# Patient Record
Sex: FEMALE | Race: WHITE | NOT HISPANIC OR LATINO | Employment: PART TIME | URBAN - METROPOLITAN AREA
[De-identification: names, ages, dates, MRNs, and addresses within clinical notes are randomized per-mention and may not be internally consistent; named-entity substitution may affect disease eponyms.]

---

## 2021-05-26 ENCOUNTER — RECORDS - HEALTHEAST (OUTPATIENT)
Dept: ADMINISTRATIVE | Facility: CLINIC | Age: 31
End: 2021-05-26

## 2021-06-16 PROBLEM — O42.90 RUPTURE OF MEMBRANES WITH DELAY OF DELIVERY: Status: ACTIVE | Noted: 2020-06-26

## 2021-06-16 PROBLEM — E66.01 MORBID OBESITY WITH BMI OF 40.0-44.9, ADULT (H): Status: ACTIVE | Noted: 2019-11-14

## 2021-06-16 PROBLEM — B95.1 GROUP BETA STREP POSITIVE: Status: ACTIVE | Noted: 2020-05-11

## 2021-06-16 PROBLEM — O26.899 RH NEGATIVE STATE IN ANTEPARTUM PERIOD: Status: ACTIVE | Noted: 2019-11-14

## 2021-06-16 PROBLEM — S46.002A INJURY OF LEFT ROTATOR CUFF: Status: ACTIVE | Noted: 2019-04-01

## 2021-06-16 PROBLEM — J30.9 ALLERGIC RHINITIS: Status: ACTIVE | Noted: 2017-08-17

## 2021-06-16 PROBLEM — Z86.69 HISTORY OF PAPILLEDEMA: Status: ACTIVE | Noted: 2020-01-02

## 2021-06-16 PROBLEM — F33.2 SEVERE EPISODE OF RECURRENT MAJOR DEPRESSIVE DISORDER, WITHOUT PSYCHOTIC FEATURES (H): Status: ACTIVE | Noted: 2017-10-17

## 2021-06-16 PROBLEM — Z01.00 ENCOUNTER FOR OPHTHALMIC EXAMINATION AND EVALUATION: Status: ACTIVE | Noted: 2020-01-02

## 2021-06-16 PROBLEM — K21.9 GASTROESOPHAGEAL REFLUX DISEASE WITHOUT ESOPHAGITIS: Status: ACTIVE | Noted: 2017-08-17

## 2021-06-16 PROBLEM — O99.213 OBESITY AFFECTING PREGNANCY IN THIRD TRIMESTER: Status: ACTIVE | Noted: 2020-03-17

## 2021-06-16 PROBLEM — F33.1 MAJOR DEPRESSIVE DISORDER, RECURRENT, MODERATE (H): Status: ACTIVE | Noted: 2017-12-04

## 2021-06-16 PROBLEM — Z67.91 RH NEGATIVE STATE IN ANTEPARTUM PERIOD: Status: ACTIVE | Noted: 2019-11-14

## 2021-12-24 ENCOUNTER — HOSPITAL ENCOUNTER (EMERGENCY)
Facility: CLINIC | Age: 31
Discharge: HOME | End: 2021-12-24
Attending: EMERGENCY MEDICINE | Admitting: EMERGENCY MEDICINE
Payer: COMMERCIAL

## 2021-12-24 ENCOUNTER — APPOINTMENT (OUTPATIENT)
Dept: CT IMAGING | Facility: CLINIC | Age: 31
End: 2021-12-24
Attending: EMERGENCY MEDICINE
Payer: COMMERCIAL

## 2021-12-24 VITALS
HEIGHT: 66 IN | TEMPERATURE: 98.2 F | BODY MASS INDEX: 40.02 KG/M2 | DIASTOLIC BLOOD PRESSURE: 84 MMHG | RESPIRATION RATE: 23 BRPM | HEART RATE: 75 BPM | OXYGEN SATURATION: 98 % | WEIGHT: 249 LBS | SYSTOLIC BLOOD PRESSURE: 111 MMHG

## 2021-12-24 DIAGNOSIS — R10.13 ABDOMINAL PAIN, EPIGASTRIC: ICD-10-CM

## 2021-12-24 LAB
ALBUMIN SERPL-MCNC: 3.6 G/DL (ref 3.5–5)
ALP SERPL-CCNC: 85 U/L (ref 45–120)
ALT SERPL W P-5'-P-CCNC: 26 U/L (ref 0–45)
ANION GAP SERPL CALCULATED.3IONS-SCNC: 8 MMOL/L (ref 5–18)
AST SERPL W P-5'-P-CCNC: 19 U/L (ref 0–40)
ATRIAL RATE - MUSE: 80 BPM
BASOPHILS # BLD AUTO: 0 10E3/UL (ref 0–0.2)
BASOPHILS NFR BLD AUTO: 0 %
BILIRUB DIRECT SERPL-MCNC: <0.1 MG/DL
BILIRUB SERPL-MCNC: 0.2 MG/DL (ref 0–1)
BUN SERPL-MCNC: 11 MG/DL (ref 8–22)
CALCIUM SERPL-MCNC: 8.6 MG/DL (ref 8.5–10.5)
CHLORIDE BLD-SCNC: 109 MMOL/L (ref 98–107)
CO2 SERPL-SCNC: 24 MMOL/L (ref 22–31)
CREAT SERPL-MCNC: 0.78 MG/DL (ref 0.6–1.1)
DIASTOLIC BLOOD PRESSURE - MUSE: NORMAL MMHG
EOSINOPHIL # BLD AUTO: 0.1 10E3/UL (ref 0–0.7)
EOSINOPHIL NFR BLD AUTO: 3 %
ERYTHROCYTE [DISTWIDTH] IN BLOOD BY AUTOMATED COUNT: 12 % (ref 10–15)
GFR SERPL CREATININE-BSD FRML MDRD: >90 ML/MIN/1.73M2
GLUCOSE BLD-MCNC: 84 MG/DL (ref 70–125)
HCT VFR BLD AUTO: 41.8 % (ref 35–47)
HGB BLD-MCNC: 13.8 G/DL (ref 11.7–15.7)
HOLD SPECIMEN: NORMAL
HOLD SPECIMEN: NORMAL
IMM GRANULOCYTES # BLD: 0 10E3/UL
IMM GRANULOCYTES NFR BLD: 0 %
INTERPRETATION ECG - MUSE: NORMAL
LIPASE SERPL-CCNC: 41 U/L (ref 0–52)
LYMPHOCYTES # BLD AUTO: 1.5 10E3/UL (ref 0.8–5.3)
LYMPHOCYTES NFR BLD AUTO: 29 %
MCH RBC QN AUTO: 31.1 PG (ref 26.5–33)
MCHC RBC AUTO-ENTMCNC: 33 G/DL (ref 31.5–36.5)
MCV RBC AUTO: 94 FL (ref 78–100)
MONOCYTES # BLD AUTO: 0.3 10E3/UL (ref 0–1.3)
MONOCYTES NFR BLD AUTO: 6 %
NEUTROPHILS # BLD AUTO: 3.3 10E3/UL (ref 1.6–8.3)
NEUTROPHILS NFR BLD AUTO: 62 %
NRBC # BLD AUTO: 0 10E3/UL
NRBC BLD AUTO-RTO: 0 /100
P AXIS - MUSE: 60 DEGREES
PLATELET # BLD AUTO: 235 10E3/UL (ref 150–450)
POTASSIUM BLD-SCNC: 3.8 MMOL/L (ref 3.5–5)
PR INTERVAL - MUSE: 216 MS
PROT SERPL-MCNC: 6.7 G/DL (ref 6–8)
QRS DURATION - MUSE: 82 MS
QT - MUSE: 368 MS
QTC - MUSE: 424 MS
R AXIS - MUSE: 51 DEGREES
RBC # BLD AUTO: 4.44 10E6/UL (ref 3.8–5.2)
SODIUM SERPL-SCNC: 141 MMOL/L (ref 136–145)
SYSTOLIC BLOOD PRESSURE - MUSE: NORMAL MMHG
T AXIS - MUSE: 58 DEGREES
VENTRICULAR RATE- MUSE: 80 BPM
WBC # BLD AUTO: 5.3 10E3/UL (ref 4–11)

## 2021-12-24 PROCEDURE — 85025 COMPLETE CBC W/AUTO DIFF WBC: CPT | Performed by: EMERGENCY MEDICINE

## 2021-12-24 PROCEDURE — 258N000003 HC RX IP 258 OP 636: Performed by: EMERGENCY MEDICINE

## 2021-12-24 PROCEDURE — 96361 HYDRATE IV INFUSION ADD-ON: CPT

## 2021-12-24 PROCEDURE — 80053 COMPREHEN METABOLIC PANEL: CPT | Performed by: EMERGENCY MEDICINE

## 2021-12-24 PROCEDURE — 250N000013 HC RX MED GY IP 250 OP 250 PS 637: Performed by: EMERGENCY MEDICINE

## 2021-12-24 PROCEDURE — 250N000011 HC RX IP 250 OP 636: Performed by: EMERGENCY MEDICINE

## 2021-12-24 PROCEDURE — 250N000009 HC RX 250: Performed by: EMERGENCY MEDICINE

## 2021-12-24 PROCEDURE — 74177 CT ABD & PELVIS W/CONTRAST: CPT

## 2021-12-24 PROCEDURE — 93005 ELECTROCARDIOGRAM TRACING: CPT | Performed by: EMERGENCY MEDICINE

## 2021-12-24 PROCEDURE — 82248 BILIRUBIN DIRECT: CPT | Performed by: EMERGENCY MEDICINE

## 2021-12-24 PROCEDURE — 36415 COLL VENOUS BLD VENIPUNCTURE: CPT | Performed by: EMERGENCY MEDICINE

## 2021-12-24 PROCEDURE — 83690 ASSAY OF LIPASE: CPT | Performed by: EMERGENCY MEDICINE

## 2021-12-24 PROCEDURE — 99285 EMERGENCY DEPT VISIT HI MDM: CPT | Mod: 25

## 2021-12-24 PROCEDURE — 96374 THER/PROPH/DIAG INJ IV PUSH: CPT | Mod: 59

## 2021-12-24 RX ORDER — IOPAMIDOL 755 MG/ML
100 INJECTION, SOLUTION INTRAVASCULAR ONCE
Status: COMPLETED | OUTPATIENT
Start: 2021-12-24 | End: 2021-12-24

## 2021-12-24 RX ORDER — ACETAMINOPHEN 500 MG
1000 TABLET ORAL ONCE
Status: COMPLETED | OUTPATIENT
Start: 2021-12-24 | End: 2021-12-24

## 2021-12-24 RX ADMIN — ACETAMINOPHEN 1000 MG: 500 TABLET ORAL at 22:51

## 2021-12-24 RX ADMIN — IOPAMIDOL 100 ML: 755 INJECTION, SOLUTION INTRAVENOUS at 22:41

## 2021-12-24 RX ADMIN — SODIUM CHLORIDE 1000 ML: 9 INJECTION, SOLUTION INTRAVENOUS at 22:51

## 2021-12-24 RX ADMIN — FAMOTIDINE 20 MG: 10 INJECTION, SOLUTION INTRAVENOUS at 21:38

## 2021-12-24 RX ADMIN — LIDOCAINE HYDROCHLORIDE 15 ML: 20 SOLUTION ORAL; TOPICAL at 21:52

## 2021-12-24 ASSESSMENT — MIFFLIN-ST. JEOR: SCORE: 1871.21

## 2021-12-25 NOTE — ED PROVIDER NOTES
EMERGENCY DEPARTMENT ENCOUNTER      NAME: Catherine Rangel  AGE: 31 year old female  YOB: 1990  MRN: [id number]  EVALUATION DATE & TIME: 2021  8:50 PM    PCP: Lucien Knutson    ED PROVIDER: Jayden Donahue D.O.      CHIEF COMPLAINT:  Chief Complaint   Patient presents with     Chest Pain     Abdominal Pain     Headache         FINAL IMPRESSION:  1. Abdominal pain, epigastric          ED COURSE & MEDICAL DECISION MAKIN year old female with a history of fibromyalgia and GERD presented to the ED for evaluation of substernal chest pain that has been occurring for the last month as well as epigastric abdominal pain that is been ongoing for the last few days.  The patient describes both pains as a burning sensation and both were worsened with eating and drinking.  In addition to this she also complained of a frontal headache for the last few days.  Upon arrival to the ED the patient was noted to be hemodynamically stable.  She did not appear to be in any obvious distress or discomfort at time for initial evaluation.  On exam she was noted to have mild tenderness of patient located in the epigastric and left upper quadrant.  She did not have evidence of acute abdomen, however.  The remainder of her physical exam was unremarkable.  Following initial evaluation the patient was given a GI cocktail and IV famotidine to treat her burning epigastric/substernal chest pain.    An EKG was obtained which revealed normal sinus rhythm without any new or concerning ST or T wave changes.  CBC, BMP, lipase, and hepatic panel were all reassuring.    The patient was reevaluated and informed of the reassuring lab and EKG results.  At the time of reevaluation the patient stated that the upper abdominal pain had improved with the famotidine and GI cocktail.  The patient was informed that her upper abdominal pain is likely rated to her previous history of GERD.  Patient admitted to stopping her omeprazole months  ago which may be why it is worsening today.  However, at the time of reevaluation the patient was now stating that she was experiencing pain in the right lower quadrant.  An abdominal CT scan will be obtained for further evaluation.       The patient's care will be turned over to Dr. Sierra who will follow up on the abdominal CT scan.  If the CT scan is normal the patient can be discharged home with instructions to take her daily omeprazole.    9:15 PM I met with the patient, obtained history, performed an initial exam, and discussed options and plan for diagnostics and treatment here in the ED.     At the conclusion of the encounter I discussed the results of all of the tests and the disposition. The questions were answered. The patient or family acknowledged understanding and was agreeable with the care plan.     MEDICATIONS GIVEN IN THE EMERGENCY:  Medications   0.9% sodium chloride BOLUS (has no administration in time range)   lidocaine (viscous) (XYLOCAINE) 2 % 7.5 mL, alum & mag hydroxide-simethicone (MAALOX) 7.5 mL GI Cocktail (15 mLs Oral Given 12/24/21 2152)   famotidine (PEPCID) injection 20 mg (20 mg Intravenous Given 12/24/21 2138)       NEW PRESCRIPTIONS STARTED AT TODAY'S ER VISIT:  New Prescriptions    No medications on file          =================================================================    HPI  Catherine Rangel is a 31 year old female with a history of PCOS, GERD, cholecystectomy, and obesity, who presents via private vehicle for evaluation of chest and abdominal pain.    Patient reports onset of burning chest pain about one month ago. She notes being evaluated for this initially though has not followed up with her PCP since. She also endorses burning abdominal pain that feels somewhat similar to her chest pain, and notes that the pain is also reminiscent of that from her cholecystectomy. Patient reports both her chest and abdominal pain are exacerbated with movement and eating/drinking,  and is relieved at rest. She has had decreased po intake for the last few days due to this. Patient has been taking Omeprazole and Tylenol without relief. Patient also endorses a pounding frontal headache with onset three days ago; this is also exacerbated with movement. Additionally reports chills and diarrhea, though her diarrhea has since resolved. She denies nausea, vomiting, or generalized myalgias.       I, Korina Sosa am serving as a scribe to document services personally performed by Dr. Jayden Donahue DO, based on my observation and the provider's statements to me. I, Dr. Jayedn oDnahue DO attest that Korina Sosa is acting in a scribe capacity, has observed my performance of the services and has documented them in accordance with my direction.      REVIEW OF SYSTEMS   Constitutional: Endorses chills. Denies fever, unintentional weight loss or fatigue   Eyes: Denies visual changes or discharge    HENT: Denies sore throat, ear pain or neck pain  Respiratory: Denies cough or shortness of breath    Cardiovascular: Endorses chest pain. Denies palpitations or leg swelling  GI: Endorses abdominal pain. Denies nausea, vomiting, or dark, bloody stools.    : Denies hematuria, dysuria, or flank pain  Musculoskeletal: Denies any new back pain or new muscle/joint pains  Skin: Denies rash or wound  Neurologic: Endorses frontal headache. Denies new weakness, focal weakness, or sensory changes    Lymphatic: Denies swollen glands    Psychiatric: Denies depression, suicidal ideation or homicidal ideation.    Remainder of systems reviewed, unless noted in HPI all others negative.      PAST MEDICAL HISTORY:  Past Medical History:   Diagnosis Date     Depression      Dural sinus thrombosis      Fibromyalgia      GERD (gastroesophageal reflux disease)      Obesity        PAST SURGICAL HISTORY:  Past Surgical History:   Procedure Laterality Date     CHOLECYSTECTOMY             CURRENT MEDICATIONS:    Prior to Admission  medications    Medication Sig Start Date End Date Taking? Authorizing Provider   EPINEPHrine (EPIPEN) 0.3 mg/0.3 mL atIn [EPINEPHRINE (EPIPEN) 0.3 MG/0.3 ML ATIN] Inject 0.3 mL (0.3 mg total) into the shoulder, thigh, or buttocks as needed. 8/6/17   Leon Chan,    famotidine (PEPCID) 40 MG tablet [FAMOTIDINE (PEPCID) 40 MG TABLET] Take 1 tablet (40 mg total) by mouth at bedtime as needed for heartburn. 6/25/17   Tod Guerin MD   lidocaine (XYLOCAINE) 5 % ointment [LIDOCAINE (XYLOCAINE) 5 % OINTMENT] Please apply to the affected area of the mouth up to 4 times a day for the next week. 4/19/20   Glenn Knight, MICAELA   predniSONE (DELTASONE) 50 MG tablet [PREDNISONE (DELTASONE) 50 MG TABLET] Take 1 tablet (50 mg total) by mouth daily. 8/6/17   Leon Chan,          ALLERGIES:  Allergies   Allergen Reactions     Blood-Group Specific Substance Other (See Comments)     Patient has probable passive anti-D. Blood product orders may be delayed. Draw one red top and two purple top tubes for all Type and Screen/Type and crossmatch orders.     Cefaclor Other (See Comments)     Latex Itching       FAMILY HISTORY:  No family history on file.    SOCIAL HISTORY:   Social History     Socioeconomic History     Marital status: Single     Spouse name: Not on file     Number of children: Not on file     Years of education: Not on file     Highest education level: Not on file   Occupational History     Not on file   Tobacco Use     Smoking status: Never Smoker     Smokeless tobacco: Not on file   Substance and Sexual Activity     Alcohol use: Yes     Comment: Alcoholic Drinks/day: occasional     Drug use: Not on file     Sexual activity: Not on file   Other Topics Concern     Not on file   Social History Narrative     Not on file     Social Determinants of Health     Financial Resource Strain: Not on file   Food Insecurity: Not on file   Transportation Needs: Not on file   Physical Activity: Not on file  "  Stress: Not on file   Social Connections: Not on file   Intimate Partner Violence: Not on file   Housing Stability: Not on file       PHYSICAL EXAM    /84   Pulse 84   Temp 98.2  F (36.8  C) (Oral)   Resp 18   Ht 1.676 m (5' 6\")   Wt 112.9 kg (249 lb)   LMP 12/22/2021 (Exact Date)   SpO2 98%   BMI 40.19 kg/m    General presentation: Alert, Vital signs reviewed. NAD  HENT: ENT inspection is normal. Oropharynx is moist and clear.   Eye: Pupils are equal and reactive to light. EOMI  Neck: The neck is supple, with full ROM, with no evidence of meningismus.  Pulmonary: Currently in no acute respiratory distress. Normal, non labored respirations, the lung sounds are normal with good equal air movement. Clear to auscultation bilaterally.   Circulatory: Regular rate and rhythm. Peripheral pulses are strong and equal. No murmurs, rubs, or gallops.   Abdominal: The abdomen is soft. Mild epigastric and LUQ TTP. No rigidity, guarding, or rebound. Bowel sounds normal.   Neurologic: Alert, oriented to person, place, and time. No motor deficit. No sensory deficit. Cranial nerves II through XII are intact.  Musculoskeletal: No extremity tenderness. Full range of motion in all extremities. No extremity edema.   Skin: Skin color is normal. No rash. Warm. Dry to touch.        LAB:  All pertinent labs reviewed and interpreted.  Labs Ordered and Resulted from Time of ED Arrival to Time of ED Departure   BASIC METABOLIC PANEL - Abnormal       Result Value    Sodium 141      Potassium 3.8      Chloride 109 (*)     Carbon Dioxide (CO2) 24      Anion Gap 8      Urea Nitrogen 11      Creatinine 0.78      Calcium 8.6      Glucose 84      GFR Estimate >90     HEPATIC FUNCTION PANEL - Normal    Bilirubin Total 0.2      Bilirubin Direct <0.1      Protein Total 6.7      Albumin 3.6      Alkaline Phosphatase 85      AST 19      ALT 26     CBC WITH PLATELETS AND DIFFERENTIAL    WBC Count 5.3      RBC Count 4.44      Hemoglobin 13.8  "     Hematocrit 41.8      MCV 94      MCH 31.1      MCHC 33.0      RDW 12.0      Platelet Count 235      % Neutrophils 62      % Lymphocytes 29      % Monocytes 6      % Eosinophils 3      % Basophils 0      % Immature Granulocytes 0      NRBCs per 100 WBC 0      Absolute Neutrophils 3.3      Absolute Lymphocytes 1.5      Absolute Monocytes 0.3      Absolute Eosinophils 0.1      Absolute Basophils 0.0      Absolute Immature Granulocytes 0.0      Absolute NRBCs 0.0     LIPASE       RADIOLOGY:  Reviewed all pertinent imaging. Please see official radiology reports  No orders to display         EKG:    Normal sinus rhythm.  Rate of 80.  First-degree AV block.  Normal QRS.  Normal QT.  No ST or T wave changes.  Compared to the EKG on 5/19/2020 the first-degree block appears new and the previous nonspecific T wave changes have resolved.    I have independently reviewed and interpreted the EKG(s) documented above.      I, Korina Sosa, am serving as a scribe to document services personally performed by Dr. Jayden Donahue based on my observation and the provider's statements to me. I, Jayden Donahue, DO attest that Korina Sosa is acting in a scribe capacity, has observed my performance of the services and has documented them in accordance with my direction.    Jayden Donahue D.O.  Emergency Medicine  [facility] EMERGENCY ROOM  [address] [facility] [address] [phone]  Dept: [phone]        Jayden Donahue DO  12/24/21 0101

## 2021-12-25 NOTE — ED PROVIDER NOTES
"ED SIGNOUT  Date/Time:12/24/2021 10:48 PM    Patient signed out to me by my colleague, Dr. Jayden Donahue.  Please see their note for complete history and physical. Plan to follow up on CT and disposition.     Briefly, Catherine Rangel is a 31 year old female with a history of PCOS, GERD, cholecystectomy, and obesity, who presents via private vehicle for evaluation of chest and abdominal pain.         The creation of this record is based on the scribe s observations of the work being performed by Maddison Sierra MD and the provider s statements to them. It was created on their behalf by Carlos London a trained medical scribe. This document has been checked and approved by the attending provider.      REMAINING ED WORKUP:    Vitals:  /84   Pulse 75   Temp 98.2  F (36.8  C) (Oral)   Resp 23   Ht 1.676 m (5' 6\")   Wt 112.9 kg (249 lb)   LMP 12/22/2021 (Exact Date)   SpO2 98%   BMI 40.19 kg/m        Pertinent labs results reviewed   Results for orders placed or performed during the hospital encounter of 12/24/21   CT Abdomen Pelvis w Contrast    Impression    IMPRESSION:   1.  Cholecystectomy.  2.  Diverticulosis left colon, without evidence for diverticulitis.  3.  No evidence for appendicitis.   Basic metabolic panel   Result Value Ref Range    Sodium 141 136 - 145 mmol/L    Potassium 3.8 3.5 - 5.0 mmol/L    Chloride 109 (H) 98 - 107 mmol/L    Carbon Dioxide (CO2) 24 22 - 31 mmol/L    Anion Gap 8 5 - 18 mmol/L    Urea Nitrogen 11 8 - 22 mg/dL    Creatinine 0.78 0.60 - 1.10 mg/dL    Calcium 8.6 8.5 - 10.5 mg/dL    Glucose 84 70 - 125 mg/dL    GFR Estimate >90 >60 mL/min/1.73m2   Hepatic function panel   Result Value Ref Range    Bilirubin Total 0.2 0.0 - 1.0 mg/dL    Bilirubin Direct <0.1 <=0.5 mg/dL    Protein Total 6.7 6.0 - 8.0 g/dL    Albumin 3.6 3.5 - 5.0 g/dL    Alkaline Phosphatase 85 45 - 120 U/L    AST 19 0 - 40 U/L    ALT 26 0 - 45 U/L   Result Value Ref Range    Lipase 41 0 - 52 U/L   CBC with " platelets and differential   Result Value Ref Range    WBC Count 5.3 4.0 - 11.0 10e3/uL    RBC Count 4.44 3.80 - 5.20 10e6/uL    Hemoglobin 13.8 11.7 - 15.7 g/dL    Hematocrit 41.8 35.0 - 47.0 %    MCV 94 78 - 100 fL    MCH 31.1 26.5 - 33.0 pg    MCHC 33.0 31.5 - 36.5 g/dL    RDW 12.0 10.0 - 15.0 %    Platelet Count 235 150 - 450 10e3/uL    % Neutrophils 62 %    % Lymphocytes 29 %    % Monocytes 6 %    % Eosinophils 3 %    % Basophils 0 %    % Immature Granulocytes 0 %    NRBCs per 100 WBC 0 <1 /100    Absolute Neutrophils 3.3 1.6 - 8.3 10e3/uL    Absolute Lymphocytes 1.5 0.8 - 5.3 10e3/uL    Absolute Monocytes 0.3 0.0 - 1.3 10e3/uL    Absolute Eosinophils 0.1 0.0 - 0.7 10e3/uL    Absolute Basophils 0.0 0.0 - 0.2 10e3/uL    Absolute Immature Granulocytes 0.0 <=0.4 10e3/uL    Absolute NRBCs 0.0 10e3/uL   Extra Blue Top Tube   Result Value Ref Range    Hold Specimen JIC    Extra Red Top Tube   Result Value Ref Range    Hold Specimen JIC    ECG 12-LEAD WITH MUSE (LHE)   Result Value Ref Range    Systolic Blood Pressure  mmHg    Diastolic Blood Pressure  mmHg    Ventricular Rate 80 BPM    Atrial Rate 80 BPM    WA Interval 216 ms    QRS Duration 82 ms     ms    QTc 424 ms    P Axis 60 degrees    R AXIS 51 degrees    T Axis 58 degrees    Interpretation ECG       Sinus rhythm with 1st degree A-V block  Otherwise normal ECG  When compared with ECG of 19-APR-2020 19:44,  WA interval has increased  T wave inversion no longer evident in Inferior leads  Nonspecific T wave abnormality no longer evident in Anterior leads  Confirmed by SEE ED PROVIDER NOTE FOR, ECG INTERPRETATION ([id number]),  ELVIA MOON ([id number]) on 12/24/2021 10:10:57 PM         Pertinent imaging reviewed   Please see official radiology report.  CT Abdomen Pelvis w Contrast   Final Result   IMPRESSION:    1.  Cholecystectomy.   2.  Diverticulosis left colon, without evidence for diverticulitis.   3.  No evidence for appendicitis.            Interventions  Medications   lidocaine (viscous) (XYLOCAINE) 2 % 7.5 mL, alum & mag hydroxide-simethicone (MAALOX) 7.5 mL GI Cocktail (15 mLs Oral Given 12/24/21 2152)   famotidine (PEPCID) injection 20 mg (20 mg Intravenous Given 12/24/21 2138)   0.9% sodium chloride BOLUS (1,000 mLs Intravenous New Bag 12/24/21 2251)   iopamidol (ISOVUE-370) solution 100 mL (100 mLs Intravenous Given 12/24/21 2241)   acetaminophen (TYLENOL) tablet 1,000 mg (1,000 mg Oral Given 12/24/21 2251)        ED Course/MDM:  10:35 PM Signout accepted from Dr.Jaremy Donahue.  Prior records were reviewed.  Diagnostics from this visit are reviewed.    CT scan here without any acute processes noted.  Patient with likely GERD, discharged with instruction to continue to take her omeprazole.           1. Abdominal pain, epigastric          Violeta Sierra MD  [facility] Department         Violeta Sierra MD  12/24/21 0455

## 2021-12-25 NOTE — ED TRIAGE NOTES
Chest pain for a couple weeks, abdominal pain x 4 days and migraine headache that feels different from her normal, feels like pounding pain that started yesterday.  Pt denies fever, reports diarrhea yesterday.

## 2021-12-25 NOTE — DISCHARGE INSTRUCTIONS
Thankfully your CAT scan here did not show any evidence of appendicitis or any other intra-abdominal abnormalities.  Please take your omeprazole daily as directed.  Follow-up with your primary care physician for reevaluation or return back to ED sooner for any worsening abdominal pain, chest pain, or any other new or concerning symptoms.

## 2022-06-07 ENCOUNTER — APPOINTMENT (OUTPATIENT)
Dept: ULTRASOUND IMAGING | Facility: CLINIC | Age: 32
End: 2022-06-07
Attending: EMERGENCY MEDICINE
Payer: COMMERCIAL

## 2022-06-07 ENCOUNTER — HOSPITAL ENCOUNTER (EMERGENCY)
Facility: CLINIC | Age: 32
Discharge: HOME | End: 2022-06-07
Attending: STUDENT IN AN ORGANIZED HEALTH CARE EDUCATION/TRAINING PROGRAM | Admitting: STUDENT IN AN ORGANIZED HEALTH CARE EDUCATION/TRAINING PROGRAM
Payer: COMMERCIAL

## 2022-06-07 VITALS
HEIGHT: 67 IN | HEART RATE: 99 BPM | RESPIRATION RATE: 18 BRPM | SYSTOLIC BLOOD PRESSURE: 159 MMHG | DIASTOLIC BLOOD PRESSURE: 91 MMHG | OXYGEN SATURATION: 97 % | WEIGHT: 250 LBS | TEMPERATURE: 97.3 F | BODY MASS INDEX: 39.24 KG/M2

## 2022-06-07 DIAGNOSIS — O20.9 BLEEDING IN EARLY PREGNANCY: ICD-10-CM

## 2022-06-07 LAB
ABO/RH(D): NORMAL
ALBUMIN UR-MCNC: NEGATIVE MG/DL
ANTIBODY SCREEN: NEGATIVE
APPEARANCE UR: CLEAR
BACTERIA #/AREA URNS HPF: ABNORMAL /HPF
BASOPHILS # BLD AUTO: 0 10E3/UL (ref 0–0.2)
BASOPHILS NFR BLD AUTO: 0 %
BILIRUB UR QL STRIP: NEGATIVE
COLOR UR AUTO: ABNORMAL
EOSINOPHIL # BLD AUTO: 0.1 10E3/UL (ref 0–0.7)
EOSINOPHIL NFR BLD AUTO: 2 %
ERYTHROCYTE [DISTWIDTH] IN BLOOD BY AUTOMATED COUNT: 12.6 % (ref 10–15)
GLUCOSE UR STRIP-MCNC: NEGATIVE MG/DL
HCG SERPL-ACNC: 635 MLU/ML (ref 0–4)
HCG UR QL: POSITIVE
HCT VFR BLD AUTO: 40.1 % (ref 35–47)
HGB BLD-MCNC: 13.4 G/DL (ref 11.7–15.7)
HGB UR QL STRIP: NEGATIVE
IMM GRANULOCYTES # BLD: 0 10E3/UL
IMM GRANULOCYTES NFR BLD: 0 %
KETONES UR STRIP-MCNC: NEGATIVE MG/DL
LEUKOCYTE ESTERASE UR QL STRIP: NEGATIVE
LYMPHOCYTES # BLD AUTO: 1.8 10E3/UL (ref 0.8–5.3)
LYMPHOCYTES NFR BLD AUTO: 25 %
MCH RBC QN AUTO: 31.2 PG (ref 26.5–33)
MCHC RBC AUTO-ENTMCNC: 33.4 G/DL (ref 31.5–36.5)
MCV RBC AUTO: 93 FL (ref 78–100)
MONOCYTES # BLD AUTO: 0.5 10E3/UL (ref 0–1.3)
MONOCYTES NFR BLD AUTO: 7 %
MUCOUS THREADS #/AREA URNS LPF: PRESENT /LPF
NEUTROPHILS # BLD AUTO: 4.8 10E3/UL (ref 1.6–8.3)
NEUTROPHILS NFR BLD AUTO: 66 %
NITRATE UR QL: NEGATIVE
NRBC # BLD AUTO: 0 10E3/UL
NRBC BLD AUTO-RTO: 0 /100
PH UR STRIP: 6 [PH] (ref 5–7)
PLATELET # BLD AUTO: 263 10E3/UL (ref 150–450)
RBC # BLD AUTO: 4.3 10E6/UL (ref 3.8–5.2)
RBC URINE: 0 /HPF
SP GR UR STRIP: 1.01 (ref 1–1.03)
SPECIMEN EXPIRATION DATE: NORMAL
SQUAMOUS EPITHELIAL: 1 /HPF
UROBILINOGEN UR STRIP-MCNC: <2 MG/DL
WBC # BLD AUTO: 7.3 10E3/UL (ref 4–11)
WBC URINE: 1 /HPF

## 2022-06-07 PROCEDURE — 86901 BLOOD TYPING SEROLOGIC RH(D): CPT | Performed by: EMERGENCY MEDICINE

## 2022-06-07 PROCEDURE — 96372 THER/PROPH/DIAG INJ SC/IM: CPT | Performed by: STUDENT IN AN ORGANIZED HEALTH CARE EDUCATION/TRAINING PROGRAM

## 2022-06-07 PROCEDURE — 86850 RBC ANTIBODY SCREEN: CPT | Performed by: EMERGENCY MEDICINE

## 2022-06-07 PROCEDURE — 81025 URINE PREGNANCY TEST: CPT | Performed by: EMERGENCY MEDICINE

## 2022-06-07 PROCEDURE — 85025 COMPLETE CBC W/AUTO DIFF WBC: CPT | Performed by: EMERGENCY MEDICINE

## 2022-06-07 PROCEDURE — 81001 URINALYSIS AUTO W/SCOPE: CPT | Performed by: STUDENT IN AN ORGANIZED HEALTH CARE EDUCATION/TRAINING PROGRAM

## 2022-06-07 PROCEDURE — 81025 URINE PREGNANCY TEST: CPT | Performed by: STUDENT IN AN ORGANIZED HEALTH CARE EDUCATION/TRAINING PROGRAM

## 2022-06-07 PROCEDURE — 76801 OB US < 14 WKS SINGLE FETUS: CPT

## 2022-06-07 PROCEDURE — 84702 CHORIONIC GONADOTROPIN TEST: CPT | Performed by: EMERGENCY MEDICINE

## 2022-06-07 PROCEDURE — 81001 URINALYSIS AUTO W/SCOPE: CPT | Performed by: EMERGENCY MEDICINE

## 2022-06-07 PROCEDURE — 99284 EMERGENCY DEPT VISIT MOD MDM: CPT | Mod: 25

## 2022-06-07 PROCEDURE — 36415 COLL VENOUS BLD VENIPUNCTURE: CPT | Performed by: EMERGENCY MEDICINE

## 2022-06-07 PROCEDURE — 250N000011 HC RX IP 250 OP 636: Performed by: STUDENT IN AN ORGANIZED HEALTH CARE EDUCATION/TRAINING PROGRAM

## 2022-06-07 RX ORDER — CEPHALEXIN 500 MG/1
500 CAPSULE ORAL 4 TIMES DAILY
Qty: 20 CAPSULE | Refills: 0 | Status: SHIPPED | OUTPATIENT
Start: 2022-06-07 | End: 2022-06-12

## 2022-06-07 RX ADMIN — HUMAN RHO(D) IMMUNE GLOBULIN 300 MCG: 1500 SOLUTION INTRAMUSCULAR; INTRAVENOUS at 18:38

## 2022-06-07 NOTE — ED PROVIDER NOTES
Emergency Department Encounter         FINAL IMPRESSION:  Vaginal bleeding first trimester        ED COURSE AND MEDICAL DECISION MAKING       ED Course as of 22 7168   Tue 2022   4257 Patient is a morbidly obese  at an unknown gestational age here after she has been having vaginal spotting over the past few weeks and now took a pregnancy test today and was positive.  She came in for evaluation.  Has abdominal cramping with no severe pain.  No chest pain or trouble breathing.  No dysuria or bowel movement changes.  Arrival her vitals are stable.  Social clinically.  Heart and lungs normal.  Abdomen is obese but benign.  Plan for basic labs, beta quant as well as ultrasound to evaluate   -Ultrasound showing intrauterine gestation.  Patient he medically stable.  Given OB/GYN follow-up.  -Patient O-.  Given RhoGAM.  Also has asymptomatic bacteria.  Plan for Keflex.  - Patient denies any abnormal vaginal discharge that would necessitate pelvic exam      4:51 PM I met with the patient for the initial interview and physical examination. Discussed plan for treatment and workup in the ED. PPE: Provider wore gloves, and paper mask.    5:58 PM I rechecked and updated the patient. I discussed the plan for discharge with the patient, and patient is agreeable. We discussed supportive cares at home and reasons for return to the ER including new or worsening symptoms - all questions and concerns addressed. Patient to be discharged by RN.     EKG  None.     At the conclusion of the encounter I discussed the results of all the tests and the disposition. The questions were answered. The patient or family acknowledged understanding and was agreeable with the care plan.                  MEDICATIONS GIVEN IN THE EMERGENCY DEPARTMENT:  Medications - No data to display    NEW PRESCRIPTIONS STARTED AT TODAY'S ED VISIT:  New Prescriptions    No medications on file       HPI     Patient information obtained from: the  patient     Use of Interpretor: N/A     Catherine Rangel is a 32 year old female with a pertinent history of fibromyalgia, Rh negative, and PCOS who presents to this ED via walk in for evaluation of vaginal bleeding.     The patient, who is a , reports the onset of spotting vaginal bleeding a few weeks ago. She states that she recently took a pregnancy test which was positive. She endorses intermittent crampy abdominal pain. She denies having spotting with any of her previous pregnancies. The patient denies dysuria, chest pain, shortness of breath, and any other symptoms or complaints at this time.     REVIEW OF SYSTEMS:  Review of Systems   Constitutional: Negative for fever, malaise  HEENT: Negative runny nose, sore throat, ear pain, neck pain  Respiratory: Negative for shortness of breath, cough, congestion  Cardiovascular: Negative for chest pain, leg edema  Gastrointestinal: Negative for abdominal distention, constipation, vomiting, nausea, diarrhea. Positive for abdominal pain   Genitourinary: Negative for dysuria and hematuria. Positive for vaginal bleeding.   Integument: Negative for rash, skin breakdown  Neurological: Negative for paresthesias, weakness, headache.  Musculoskeletal: Negative for joint pain, joint swelling      All other systems reviewed and are negative.          MEDICAL HISTORY     Past Medical History:   Diagnosis Date     Depression      Dural sinus thrombosis      Fibromyalgia      GERD (gastroesophageal reflux disease)      Obesity        Past Surgical History:   Procedure Laterality Date     CHOLECYSTECTOMY         Social History     Tobacco Use     Smoking status: Never Smoker   Substance Use Topics     Alcohol use: Yes     Comment: Alcoholic Drinks/day: occasional       EPINEPHrine (EPIPEN) 0.3 mg/0.3 mL atIn  famotidine (PEPCID) 40 MG tablet  lidocaine (XYLOCAINE) 5 % ointment  predniSONE (DELTASONE) 50 MG tablet            PHYSICAL EXAM     BP (!) 159/91   Pulse 99   Temp  "97.3  F (36.3  C) (Temporal)   Resp 18   Ht 1.702 m (5' 7\")   Wt 113.4 kg (250 lb)   SpO2 97%   BMI 39.16 kg/m        PHYSICAL EXAM:     General: Patient appears well, nontoxic, comfortable  HEENT: Moist mucous membranes, no tongue swelling.  No head trauma.  No midline neck pain.  Cardiovascular: Normal rate, normal rhythm, no extremity edema.  No appreciable murmur.  Respiratory: No signs of respiratory distress, lungs are clear to auscultation bilaterally with no wheezes rhonchi or rales.  Abdominal: Soft, obese, nontender, nondistended, no palpable masses, no guarding, no rebound  Musculoskeletal: Full range of motion of joints, no deformities appreciated.  Neurological: Alert and oriented, grossly neurologically intact.  Psychological: Normal affect and mood.  Integument: No rashes appreciated          RESULTS       Labs Ordered and Resulted from Time of ED Arrival to Time of ED Departure   ROUTINE UA WITH MICROSCOPIC REFLEX TO CULTURE - Abnormal       Result Value    Color Urine Light Yellow      Appearance Urine Clear      Glucose Urine Negative      Bilirubin Urine Negative      Ketones Urine Negative      Specific Gravity Urine 1.011      Blood Urine Negative      pH Urine 6.0      Protein Albumin Urine Negative      Urobilinogen Urine <2.0      Nitrite Urine Negative      Leukocyte Esterase Urine Negative      Bacteria Urine Few (*)     Mucus Urine Present (*)     RBC Urine 0      WBC Urine 1      Squamous Epithelials Urine 1     HCG QUALITATIVE URINE - Abnormal    hCG Urine Qualitative Positive (*)    HCG QUANTITATIVE PREGNANCY - Abnormal    hCG Quantitative 635 (*)    CBC WITH PLATELETS AND DIFFERENTIAL    WBC Count 7.3      RBC Count 4.30      Hemoglobin 13.4      Hematocrit 40.1      MCV 93      MCH 31.2      MCHC 33.4      RDW 12.6      Platelet Count 263      % Neutrophils 66      % Lymphocytes 25      % Monocytes 7      % Eosinophils 2      % Basophils 0      % Immature Granulocytes 0      " NRBCs per 100 WBC 0      Absolute Neutrophils 4.8      Absolute Lymphocytes 1.8      Absolute Monocytes 0.5      Absolute Eosinophils 0.1      Absolute Basophils 0.0      Absolute Immature Granulocytes 0.0      Absolute NRBCs 0.0     TYPE AND SCREEN, ADULT    ABO/RH(D) O NEG      Antibody Screen Negative      SPECIMEN EXPIRATION DATE [id number]     ABO/RH TYPE AND SCREEN       OB  US 1st trimester w transvag   Final Result   IMPRESSION:    1.  Single very early intrauterine gestation with gestational sac equaling 4 weeks 5 days. Fetal pole not yet seen.      2.  Tiny subchorionic lead in the endometrial canal.                              PROCEDURES:  Procedures:  Procedures       I, Stefany Lamas am serving as a scribe to document services personally performed by Colin Will DO, based on my observations and the provider's statements to me.  I, Colin Will DO, attest that Stefany Cydney is acting in a scribe capacity, has observed my performance of the services and has documented them in accordance with my direction.    Colin Will DO  Emergency Medicine  [facility] EMERGENCY ROOM      Colin Will DO  06/07/22 1809       Colin Will DO  06/07/22 1809

## 2022-06-07 NOTE — ED TRIAGE NOTES
Pt here with vaginal spotting since last week. Has not been wearing a pad. Only sees it when she wipes. States last menses was in April but is has been irregular. Has no pain now.

## 2022-06-07 NOTE — DISCHARGE INSTRUCTIONS
Your blood work today was reassuring.  The ultrasound showed a very early pregnancy approximately 4 weeks.  Please call the OB/GYN clinic listed if you do not already have an OB/GYN otherwise return for any worsening abdominal pain, worsening bleeding which means soaking more than 1 pad every hour, or any other concerning symptoms.

## 2023-01-01 ENCOUNTER — HOSPITAL ENCOUNTER (EMERGENCY)
Facility: CLINIC | Age: 33
Discharge: HOME | End: 2023-01-01
Attending: STUDENT IN AN ORGANIZED HEALTH CARE EDUCATION/TRAINING PROGRAM | Admitting: STUDENT IN AN ORGANIZED HEALTH CARE EDUCATION/TRAINING PROGRAM
Payer: COMMERCIAL

## 2023-01-01 ENCOUNTER — APPOINTMENT (OUTPATIENT)
Dept: ULTRASOUND IMAGING | Facility: CLINIC | Age: 33
End: 2023-01-01
Attending: STUDENT IN AN ORGANIZED HEALTH CARE EDUCATION/TRAINING PROGRAM
Payer: COMMERCIAL

## 2023-01-01 VITALS
SYSTOLIC BLOOD PRESSURE: 138 MMHG | RESPIRATION RATE: 19 BRPM | OXYGEN SATURATION: 98 % | HEART RATE: 84 BPM | DIASTOLIC BLOOD PRESSURE: 71 MMHG | HEIGHT: 66 IN | TEMPERATURE: 97.2 F | BODY MASS INDEX: 40.18 KG/M2 | WEIGHT: 250 LBS

## 2023-01-01 DIAGNOSIS — N93.9 VAGINAL BLEEDING: ICD-10-CM

## 2023-01-01 PROBLEM — R06.83 SNORING: Status: ACTIVE | Noted: 2023-01-01

## 2023-01-01 LAB
ABO/RH(D): NORMAL
ALBUMIN UR-MCNC: NEGATIVE MG/DL
ANION GAP SERPL CALCULATED.3IONS-SCNC: 8 MMOL/L (ref 5–18)
ANTIBODY SCREEN: NEGATIVE
APPEARANCE UR: CLEAR
BACTERIA #/AREA URNS HPF: ABNORMAL /HPF
BILIRUB UR QL STRIP: NEGATIVE
BUN SERPL-MCNC: 9 MG/DL (ref 8–22)
CALCIUM SERPL-MCNC: 9.6 MG/DL (ref 8.5–10.5)
CHLORIDE BLD-SCNC: 104 MMOL/L (ref 98–107)
CO2 SERPL-SCNC: 23 MMOL/L (ref 22–31)
COLOR UR AUTO: ABNORMAL
CREAT SERPL-MCNC: 1.03 MG/DL (ref 0.6–1.1)
ERYTHROCYTE [DISTWIDTH] IN BLOOD BY AUTOMATED COUNT: 12.7 % (ref 10–15)
GFR SERPL CREATININE-BSD FRML MDRD: 75 ML/MIN/1.73M2
GLUCOSE BLD-MCNC: 79 MG/DL (ref 70–125)
GLUCOSE UR STRIP-MCNC: NEGATIVE MG/DL
HCG SERPL-ACNC: ABNORMAL MLU/ML (ref 0–4)
HCT VFR BLD AUTO: 41 % (ref 35–47)
HGB BLD-MCNC: 13.7 G/DL (ref 11.7–15.7)
HGB UR QL STRIP: NEGATIVE
KETONES UR STRIP-MCNC: NEGATIVE MG/DL
LEUKOCYTE ESTERASE UR QL STRIP: NEGATIVE
MCH RBC QN AUTO: 31.4 PG (ref 26.5–33)
MCHC RBC AUTO-ENTMCNC: 33.4 G/DL (ref 31.5–36.5)
MCV RBC AUTO: 94 FL (ref 78–100)
MUCOUS THREADS #/AREA URNS LPF: PRESENT /LPF
NITRATE UR QL: NEGATIVE
PH UR STRIP: 6.5 [PH] (ref 5–7)
PLATELET # BLD AUTO: 233 10E3/UL (ref 150–450)
POTASSIUM BLD-SCNC: 4.1 MMOL/L (ref 3.5–5)
RBC # BLD AUTO: 4.36 10E6/UL (ref 3.8–5.2)
RBC URINE: 0 /HPF
SODIUM SERPL-SCNC: 135 MMOL/L (ref 136–145)
SP GR UR STRIP: 1.02 (ref 1–1.03)
SPECIMEN EXPIRATION DATE: NORMAL
SQUAMOUS EPITHELIAL: 1 /HPF
UROBILINOGEN UR STRIP-MCNC: <2 MG/DL
WBC # BLD AUTO: 9.5 10E3/UL (ref 4–11)
WBC URINE: 7 /HPF

## 2023-01-01 PROCEDURE — 81001 URINALYSIS AUTO W/SCOPE: CPT | Performed by: STUDENT IN AN ORGANIZED HEALTH CARE EDUCATION/TRAINING PROGRAM

## 2023-01-01 PROCEDURE — 99285 EMERGENCY DEPT VISIT HI MDM: CPT | Mod: 25

## 2023-01-01 PROCEDURE — 85027 COMPLETE CBC AUTOMATED: CPT | Performed by: STUDENT IN AN ORGANIZED HEALTH CARE EDUCATION/TRAINING PROGRAM

## 2023-01-01 PROCEDURE — 80048 BASIC METABOLIC PNL TOTAL CA: CPT | Performed by: STUDENT IN AN ORGANIZED HEALTH CARE EDUCATION/TRAINING PROGRAM

## 2023-01-01 PROCEDURE — 84702 CHORIONIC GONADOTROPIN TEST: CPT | Performed by: STUDENT IN AN ORGANIZED HEALTH CARE EDUCATION/TRAINING PROGRAM

## 2023-01-01 PROCEDURE — 96372 THER/PROPH/DIAG INJ SC/IM: CPT | Performed by: STUDENT IN AN ORGANIZED HEALTH CARE EDUCATION/TRAINING PROGRAM

## 2023-01-01 PROCEDURE — 36415 COLL VENOUS BLD VENIPUNCTURE: CPT | Performed by: STUDENT IN AN ORGANIZED HEALTH CARE EDUCATION/TRAINING PROGRAM

## 2023-01-01 PROCEDURE — 86901 BLOOD TYPING SEROLOGIC RH(D): CPT | Performed by: STUDENT IN AN ORGANIZED HEALTH CARE EDUCATION/TRAINING PROGRAM

## 2023-01-01 PROCEDURE — 76801 OB US < 14 WKS SINGLE FETUS: CPT

## 2023-01-01 PROCEDURE — 250N000011 HC RX IP 250 OP 636: Performed by: STUDENT IN AN ORGANIZED HEALTH CARE EDUCATION/TRAINING PROGRAM

## 2023-01-01 RX ORDER — CEPHALEXIN 500 MG/1
500 CAPSULE ORAL 4 TIMES DAILY
Qty: 28 CAPSULE | Refills: 0 | Status: SHIPPED | OUTPATIENT
Start: 2023-01-01 | End: 2023-01-08

## 2023-01-01 RX ADMIN — HUMAN RHO(D) IMMUNE GLOBULIN 300 MCG: 1500 SOLUTION INTRAMUSCULAR; INTRAVENOUS at 15:26

## 2023-01-01 ASSESSMENT — ENCOUNTER SYMPTOMS
ABDOMINAL PAIN: 1
DYSURIA: 0
HEMATURIA: 0
COUGH: 1
HEADACHES: 1
NAUSEA: 1
FEVER: 0

## 2023-01-01 ASSESSMENT — ACTIVITIES OF DAILY LIVING (ADL): ADLS_ACUITY_SCORE: 35

## 2023-01-01 NOTE — ED TRIAGE NOTES
Positive pregnancy test a few weeks ago. Yesterday had some light spotting and today had some bright red blood she noticed after having a BM. Very mild cramping.  A1     Triage Assessment     Row Name 23 [id number]       Triage Assessment (Adult)    Airway WDL WDL       Respiratory WDL    Respiratory WDL WDL       Skin Circulation/Temperature WDL    Skin Circulation/Temperature WDL WDL       Cardiac WDL    Cardiac WDL WDL       Peripheral/Neurovascular WDL    Peripheral Neurovascular WDL WDL       Cognitive/Neuro/Behavioral WDL    Cognitive/Neuro/Behavioral WDL WDL

## 2023-01-01 NOTE — ED PROVIDER NOTES
NAME: Catherine Rangel  AGE: 32 year old female  YOB: 1990  MRN: [id number]  EVALUATION DATE & TIME: 2023  1:51 PM    PCP: Lucien Knutson  ED PROVIDER: Avis Hayden MD.    Chief Complaint   Patient presents with     Vaginal Bleeding - Pregnant       FINAL IMPRESSION:  1. Vaginal bleeding        MEDICAL DECISION MAKIN:58 PM I met with the patient, obtained history, performed an initial exam, and discussed options and plan for diagnostics and treatment here in the ED.     32 year old female presents to the Emergency Department for evaluation of vaginal bleeding in pregnancy (approximately 8 weeks by LMP). Vitals are generally reassuring. Differential includes but not limited to threatened , spontaneous , missed , ectopic pregnancy, placenta previa, placental abruption. She is not having large amount of bleeding or suspicions symptoms suggesting significant anemia and Hgb is 13.7. Rh negative, rhogam given. Ultrasound showed single IUP at 6 weeks 2 days, small to moderate subchorionic hemorrhage. Will treat UA given is pregnant, allergic to cefalor but had keflex this summer and tolerated it and she is okay trying this. No flank pain, fevers, doubt stone, pyelonephritis or sepsis. Recommended close follow up with OB/GYN. Strict return precautions discussed and patient is in agreement with plan, endorses understanding and their questions were answered.    Medical Decision Making    History:    Supplemental history from: N/A    External Record(s) reviewed: Documented in HPI, if applicable.    Work Up:    Chart documentation includes differential considered and any EKGs or imaging interpreted by provider.    In additional to work up documented, I considered the following work up: See chart documentation, if applicable.    External consultation:    Discussion of management with another provider: See chart documentation, if applicable    Complicating factors:    Care  impacted by chronic illness: N/A    Care affected by social determinants of health: N/A    Disposition considerations: Discharge. I prescribed additional prescription strength medication(s) as charted. I considered admission, but ultimately discharged patient given reassuring vitals, labs and imaging.      MEDICATIONS GIVEN IN THE EMERGENCY:  Medications   rho(D) immune globulin (RHOPHYLAC) injection 300 mcg (has no administration in time range)     Or   rho(D) immune globulin (RHOPHYLAC) injection 300 mcg (has no administration in time range)       NEW PRESCRIPTIONS STARTED AT TODAY'S ER VISIT:  New Prescriptions    No medications on file        =================================================================  HPI    Patient information was obtained from: Patient  Use of : N/A       Catherine Rangel is a 32 year old female with a past medical history of PCOS who presents for vaginal bleeding.     Per chart review:   On 6/07/2022 at [facility], patient presented for vaginal bleeding. Patient pregnant at an unknown gestational age. Ultrasound showed intrauterine gestation and tiny subchorionic lead in the endometrial canal. Patient is medically stable. Given OB GYN follow-up. Patient O-.  Given RhoGAM.  Also has asymptomatic bacteria. Plan for Keflex. No abnormal vaginal discharge to necessitate pelvic exam. Discharged in stable condition.     Patient endorses having a positive home pregnancy test a few weeks ago. Patient notes her last period was early November. Yesterday, patient noticed light spotting. Today, patient noticed lots of blood, but no clots, and not to the point of soaking pads. Patient also has associated lower middle abdominal cramping, headache, nausea, breast tenderness, and a slight cough.    Of note, patient had a miscarriage last year. Patient has concerns for a UTI. Patient denies fever, dysuria, hematuria, or any other complaints at this time.     REVIEW OF SYSTEMS    Review of Systems   Constitutional: Negative for fever.   Respiratory: Positive for cough (slight).    Gastrointestinal: Positive for abdominal pain (lower middle - cramping) and nausea.   Genitourinary: Positive for vaginal bleeding. Negative for dysuria and hematuria.   Musculoskeletal:        Positive for breast tenderness   Neurological: Positive for headaches.   All other systems reviewed and are negative.       PAST MEDICAL HISTORY:  Past Medical History:   Diagnosis Date     Depression      Dural sinus thrombosis      Fibromyalgia      GERD (gastroesophageal reflux disease)      Obesity        PAST SURGICAL HISTORY:  Past Surgical History:   Procedure Laterality Date     CHOLECYSTECTOMY         CURRENT MEDICATIONS:      Current Facility-Administered Medications:      rho(D) immune globulin (RHOPHYLAC) injection 300 mcg, 300 mcg, Intravenous, Once **OR** rho(D) immune globulin (RHOPHYLAC) injection 300 mcg, 300 mcg, Intramuscular, Once, Avis Hayden MD    Current Outpatient Medications:      EPINEPHrine (EPIPEN) 0.3 mg/0.3 mL atIn, [EPINEPHRINE (EPIPEN) 0.3 MG/0.3 ML ATIN] Inject 0.3 mL (0.3 mg total) into the shoulder, thigh, or buttocks as needed., Disp: 1, Rfl: 0     famotidine (PEPCID) 40 MG tablet, [FAMOTIDINE (PEPCID) 40 MG TABLET] Take 1 tablet (40 mg total) by mouth at bedtime as needed for heartburn., Disp: 20 tablet, Rfl: 0     lidocaine (XYLOCAINE) 5 % ointment, [LIDOCAINE (XYLOCAINE) 5 % OINTMENT] Please apply to the affected area of the mouth up to 4 times a day for the next week., Disp: 50 g, Rfl: 0     predniSONE (DELTASONE) 50 MG tablet, [PREDNISONE (DELTASONE) 50 MG TABLET] Take 1 tablet (50 mg total) by mouth daily., Disp: 5 tablet, Rfl: 0    ALLERGIES:  Allergies   Allergen Reactions     Blood-Group Specific Substance Other (See Comments)     Patient has probable passive anti-D. Blood product orders may be delayed. Draw one red top and two purple top tubes for all Type and Screen/Type  "and crossmatch orders.     Cefaclor Other (See Comments)     Latex Itching       FAMILY HISTORY:  History reviewed. No pertinent family history.    SOCIAL HISTORY:   Social History     Socioeconomic History     Marital status: Single   Tobacco Use     Smoking status: Never   Substance and Sexual Activity     Alcohol use: Yes     Comment: Alcoholic Drinks/day: occasional     PHYSICAL EXAM:    Vitals: /71   Pulse 84   Temp 97.2  F (36.2  C)   Resp 19   Ht 1.676 m (5' 6\")   Wt 113.4 kg (250 lb)   LMP 11/21/2022   SpO2 98%   BMI 40.35 kg/m     Constitutional: Well developed, well nourished. No acute distress.  HENT: Normocephalic, atraumatic, mucous membranes moist. Neck-gross ROM intact.   Eyes: Pupils mid-range, sclera white, no discharge  Respiratory: CTAB, no respiratory distress, no wheezing, speaks full sentences easily.  Cardiovascular: Normal heart rate, regular rhythm, no murmur  GI: Soft, not distended, not tender to palpation, no palpable masses  Musculoskeletal: Moving all 4 extremities intentionally and without pain. No obvious deformity.  Skin: Warm, dry, no rash.  Neurologic: Alert & oriented x 3, speech clear, moving all extremities spontaneously   Psychiatric: Affect normal, cooperative.     LAB:  All pertinent labs reviewed and interpreted.  Labs Ordered and Resulted from Time of ED Arrival to Time of ED Departure   BASIC METABOLIC PANEL - Abnormal       Result Value    Sodium 135 (*)     Potassium 4.1      Chloride 104      Carbon Dioxide (CO2) 23      Anion Gap 8      Urea Nitrogen 9      Creatinine 1.03      Calcium 9.6      Glucose 79      GFR Estimate 75     HCG QUANTITATIVE PREGNANCY - Abnormal    hCG Quantitative 29,917 (*)    CBC WITH PLATELETS - Normal    WBC Count 9.5      RBC Count 4.36      Hemoglobin 13.7      Hematocrit 41.0      MCV 94      MCH 31.4      MCHC 33.4      RDW 12.7      Platelet Count 233     ROUTINE UA WITH MICROSCOPIC REFLEX TO CULTURE   TYPE AND SCREEN, " ADULT    ABO/RH(D) O NEG      Antibody Screen Negative      SPECIMEN EXPIRATION DATE [id number]     RH IMMUNE GLOBULIN SCREEN   ABO/RH TYPE AND SCREEN       RADIOLOGY:  OB  US 1st trimester w transvag    (Results Pending)       PROCEDURES:   Procedures     I, Radha Mahan, am serving as a scribe to document services personally performed by Dr. Avis Hayden based on my observation and the provider's statements to me. I, Avis Hayden MD attest that Radha Mahan is acting in a scribe capacity, has observed my performance of the services and has documented them in accordance with my direction.    Avis Hayden M.D.  Emergency Medicine  [facility] EMERGENCY ROOM  On license of [facility] [facility] [id number] [phone]  Dept: [phone]     Avis Hayden MD  01/01/23 5004

## 2023-01-01 NOTE — DISCHARGE INSTRUCTIONS
Ultrasound showed single living pregnancy at 6 weeks and 2 days  We did see findings of subchorionic hemorrhage  Please follow up closely with OB/GYN  Return to the Emergency Room with fevers, abdominal pain, soaking through pads (>1 pad per hour) or other worsening symptoms or concerns

## 2023-02-16 ENCOUNTER — APPOINTMENT (OUTPATIENT)
Dept: ULTRASOUND IMAGING | Facility: CLINIC | Age: 33
End: 2023-02-16
Attending: STUDENT IN AN ORGANIZED HEALTH CARE EDUCATION/TRAINING PROGRAM
Payer: COMMERCIAL

## 2023-02-16 ENCOUNTER — HOSPITAL ENCOUNTER (EMERGENCY)
Facility: CLINIC | Age: 33
Discharge: HOME | End: 2023-02-16
Attending: STUDENT IN AN ORGANIZED HEALTH CARE EDUCATION/TRAINING PROGRAM | Admitting: STUDENT IN AN ORGANIZED HEALTH CARE EDUCATION/TRAINING PROGRAM
Payer: COMMERCIAL

## 2023-02-16 VITALS
OXYGEN SATURATION: 98 % | HEART RATE: 93 BPM | TEMPERATURE: 97.5 F | SYSTOLIC BLOOD PRESSURE: 134 MMHG | DIASTOLIC BLOOD PRESSURE: 78 MMHG | RESPIRATION RATE: 16 BRPM

## 2023-02-16 DIAGNOSIS — R19.7 NAUSEA VOMITING AND DIARRHEA: ICD-10-CM

## 2023-02-16 DIAGNOSIS — R11.2 NAUSEA VOMITING AND DIARRHEA: ICD-10-CM

## 2023-02-16 LAB
ALBUMIN SERPL-MCNC: 3.5 G/DL (ref 3.5–5)
ALBUMIN UR-MCNC: 20 MG/DL
ALP SERPL-CCNC: 67 U/L (ref 45–120)
ALT SERPL W P-5'-P-CCNC: 28 U/L (ref 0–45)
ANION GAP SERPL CALCULATED.3IONS-SCNC: 12 MMOL/L (ref 5–18)
APPEARANCE UR: CLEAR
AST SERPL W P-5'-P-CCNC: 24 U/L (ref 0–40)
BASOPHILS # BLD AUTO: 0 10E3/UL (ref 0–0.2)
BASOPHILS NFR BLD AUTO: 0 %
BILIRUB SERPL-MCNC: 0.5 MG/DL (ref 0–1)
BILIRUB UR QL STRIP: NEGATIVE
BUN SERPL-MCNC: 8 MG/DL (ref 8–22)
CALCIUM SERPL-MCNC: 9.5 MG/DL (ref 8.5–10.5)
CHLORIDE BLD-SCNC: 104 MMOL/L (ref 98–107)
CO2 SERPL-SCNC: 22 MMOL/L (ref 22–31)
COLOR UR AUTO: YELLOW
CREAT SERPL-MCNC: 0.71 MG/DL (ref 0.6–1.1)
EOSINOPHIL # BLD AUTO: 0.1 10E3/UL (ref 0–0.7)
EOSINOPHIL NFR BLD AUTO: 1 %
ERYTHROCYTE [DISTWIDTH] IN BLOOD BY AUTOMATED COUNT: 12.4 % (ref 10–15)
GFR SERPL CREATININE-BSD FRML MDRD: >90 ML/MIN/1.73M2
GLUCOSE BLD-MCNC: 95 MG/DL (ref 70–125)
GLUCOSE UR STRIP-MCNC: NEGATIVE MG/DL
HCT VFR BLD AUTO: 46.7 % (ref 35–47)
HGB BLD-MCNC: 15.7 G/DL (ref 11.7–15.7)
HGB UR QL STRIP: NEGATIVE
HOLD SPECIMEN: NORMAL
HOLD SPECIMEN: NORMAL
IMM GRANULOCYTES # BLD: 0 10E3/UL
IMM GRANULOCYTES NFR BLD: 0 %
KETONES UR STRIP-MCNC: 20 MG/DL
LEUKOCYTE ESTERASE UR QL STRIP: NEGATIVE
LIPASE SERPL-CCNC: 20 U/L (ref 0–52)
LYMPHOCYTES # BLD AUTO: 0.9 10E3/UL (ref 0.8–5.3)
LYMPHOCYTES NFR BLD AUTO: 9 %
MCH RBC QN AUTO: 31.7 PG (ref 26.5–33)
MCHC RBC AUTO-ENTMCNC: 33.6 G/DL (ref 31.5–36.5)
MCV RBC AUTO: 94 FL (ref 78–100)
MONOCYTES # BLD AUTO: 0.3 10E3/UL (ref 0–1.3)
MONOCYTES NFR BLD AUTO: 3 %
MUCOUS THREADS #/AREA URNS LPF: PRESENT /LPF
NEUTROPHILS # BLD AUTO: 8.4 10E3/UL (ref 1.6–8.3)
NEUTROPHILS NFR BLD AUTO: 87 %
NITRATE UR QL: NEGATIVE
NRBC # BLD AUTO: 0 10E3/UL
NRBC BLD AUTO-RTO: 0 /100
PH UR STRIP: 6 [PH] (ref 5–7)
PLATELET # BLD AUTO: 268 10E3/UL (ref 150–450)
POTASSIUM BLD-SCNC: 3.6 MMOL/L (ref 3.5–5)
PROT SERPL-MCNC: 7.7 G/DL (ref 6–8)
RBC # BLD AUTO: 4.95 10E6/UL (ref 3.8–5.2)
RBC URINE: 1 /HPF
SODIUM SERPL-SCNC: 138 MMOL/L (ref 136–145)
SP GR UR STRIP: 1.03 (ref 1–1.03)
SQUAMOUS EPITHELIAL: 4 /HPF
UROBILINOGEN UR STRIP-MCNC: <2 MG/DL
WBC # BLD AUTO: 9.6 10E3/UL (ref 4–11)
WBC URINE: 3 /HPF

## 2023-02-16 PROCEDURE — 36415 COLL VENOUS BLD VENIPUNCTURE: CPT | Performed by: STUDENT IN AN ORGANIZED HEALTH CARE EDUCATION/TRAINING PROGRAM

## 2023-02-16 PROCEDURE — 80053 COMPREHEN METABOLIC PANEL: CPT | Performed by: STUDENT IN AN ORGANIZED HEALTH CARE EDUCATION/TRAINING PROGRAM

## 2023-02-16 PROCEDURE — 258N000003 HC RX IP 258 OP 636: Performed by: STUDENT IN AN ORGANIZED HEALTH CARE EDUCATION/TRAINING PROGRAM

## 2023-02-16 PROCEDURE — 250N000011 HC RX IP 250 OP 636: Performed by: STUDENT IN AN ORGANIZED HEALTH CARE EDUCATION/TRAINING PROGRAM

## 2023-02-16 PROCEDURE — 83690 ASSAY OF LIPASE: CPT | Performed by: STUDENT IN AN ORGANIZED HEALTH CARE EDUCATION/TRAINING PROGRAM

## 2023-02-16 PROCEDURE — 76805 OB US >/= 14 WKS SNGL FETUS: CPT

## 2023-02-16 PROCEDURE — 85025 COMPLETE CBC W/AUTO DIFF WBC: CPT | Performed by: STUDENT IN AN ORGANIZED HEALTH CARE EDUCATION/TRAINING PROGRAM

## 2023-02-16 PROCEDURE — 96374 THER/PROPH/DIAG INJ IV PUSH: CPT

## 2023-02-16 PROCEDURE — 99285 EMERGENCY DEPT VISIT HI MDM: CPT | Mod: 25

## 2023-02-16 PROCEDURE — 81001 URINALYSIS AUTO W/SCOPE: CPT | Performed by: STUDENT IN AN ORGANIZED HEALTH CARE EDUCATION/TRAINING PROGRAM

## 2023-02-16 PROCEDURE — 96361 HYDRATE IV INFUSION ADD-ON: CPT

## 2023-02-16 PROCEDURE — 96375 TX/PRO/DX INJ NEW DRUG ADDON: CPT

## 2023-02-16 RX ORDER — METOCLOPRAMIDE HYDROCHLORIDE 5 MG/ML
10 INJECTION INTRAMUSCULAR; INTRAVENOUS ONCE
Status: COMPLETED | OUTPATIENT
Start: 2023-02-16 | End: 2023-02-16

## 2023-02-16 RX ORDER — DIPHENHYDRAMINE HYDROCHLORIDE 50 MG/ML
25 INJECTION INTRAMUSCULAR; INTRAVENOUS ONCE
Status: COMPLETED | OUTPATIENT
Start: 2023-02-16 | End: 2023-02-16

## 2023-02-16 RX ORDER — ONDANSETRON 4 MG/1
4 TABLET, ORALLY DISINTEGRATING ORAL EVERY 8 HOURS PRN
Qty: 10 TABLET | Refills: 0 | Status: SHIPPED | OUTPATIENT
Start: 2023-02-16 | End: 2023-02-19

## 2023-02-16 RX ADMIN — SODIUM CHLORIDE 1000 ML: 9 INJECTION, SOLUTION INTRAVENOUS at 19:55

## 2023-02-16 RX ADMIN — METOCLOPRAMIDE HYDROCHLORIDE 10 MG: 5 INJECTION INTRAMUSCULAR; INTRAVENOUS at 19:57

## 2023-02-16 RX ADMIN — DIPHENHYDRAMINE HYDROCHLORIDE 25 MG: 50 INJECTION INTRAMUSCULAR; INTRAVENOUS at 19:55

## 2023-02-16 ASSESSMENT — ENCOUNTER SYMPTOMS
DIZZINESS: 1
VOMITING: 1
BLOOD IN STOOL: 0
FREQUENCY: 1
ABDOMINAL PAIN: 1
DYSURIA: 0
DIARRHEA: 1
HEMATURIA: 0
COUGH: 1
NAUSEA: 1
FEVER: 0
ROS GI COMMENTS: DENIES HEMATEMESIS.
HEADACHES: 1
RHINORRHEA: 1

## 2023-02-16 ASSESSMENT — ACTIVITIES OF DAILY LIVING (ADL): ADLS_ACUITY_SCORE: 35

## 2023-02-17 NOTE — ED TRIAGE NOTES
Pt presents with n/v/d that started yesterday evening. Pt reports feeling more weak since symptoms started. Of note, pt is 13 wks pregnant. ABCs intact.      Triage Assessment     Row Name 02/16/23 1716       Triage Assessment (Adult)    Airway WDL WDL       Respiratory WDL    Respiratory WDL WDL       Skin Circulation/Temperature WDL    Skin Circulation/Temperature WDL WDL       Cardiac WDL    Cardiac WDL WDL       Peripheral/Neurovascular WDL    Peripheral Neurovascular WDL WDL       Cognitive/Neuro/Behavioral WDL    Cognitive/Neuro/Behavioral WDL WDL

## 2023-02-17 NOTE — DISCHARGE INSTRUCTIONS
Thankfully our labs and ultrasond were reassuring  Can take zofran as needed for nausea/vomiting  Follow up closely with your primary care or OB/GYN provider  Return with new worsening abdominal pain, vaginal bleeding, unable to keep down foods/fluids, fevers, or other worsening symptoms or concerns

## 2023-02-17 NOTE — ED PROVIDER NOTES
NAME: Catherine Rangel  AGE: 32 year old female  YOB: 1990  MRN: [id number]  EVALUATION DATE & TIME: No admission date for patient encounter.    PCP: Lucien Knutson  ED PROVIDER: Avis Hayden MD.    Chief Complaint   Patient presents with     Nausea, Vomiting, & Diarrhea     Headache       FINAL IMPRESSION:  No diagnosis found.    MEDICAL DECISION MAKIN:31 PM I met with the patient, obtained history, performed an initial exam, and discussed options and plan for diagnostics and treatment here in the ED. PPE worn including surgical mask, surgical gloves.  9:28 PM I reevaluated and updated the patient.    MDM: 32-year-old female who is approximately 12 weeks pregnant who presents with nausea, vomiting, diarrhea, headache, runny nose among other symptoms.  Associated with some lower abdominal cramping. CBC, CMP, lipase all reassuring. UA without blood or signs of infection. No vaginal bleeding. Her abdominal exam is reassuring here with no tenderness.  I do not suspect appendicitis, torsion, cholecystitis or other emergent abdominal process. No fevers and vitals are reassuring, do not suspect sepsis. No neuro deficits, ambulates with steady gait, no fevers--do not suspect meningitis or emergent intracranial pathology.    Did get an OB ultrasound showed single intrauterine pregnancy. She was given fluids, reglan, benadryl with significant improvement in symptoms. Tolerate PO without issues. Possible viral infection. Will discharge with few tabs of zofran and close outpatient follow up. Strict return precautions discussed and patient is in agreement with plan, endorses understanding and her questions were answered.    Medical Decision Making    History:    Supplemental history from: Documented in chart, if applicable    External Record(s) reviewed: Documented in chart, if applicable.    Work Up:    Chart documentation includes differential considered and any EKGs or imaging interpreted by  "provider.    In additional to work up documented, I considered the following work up: Documented in chart, if applicable.    External consultation:    Discussion of management with another provider: Documented in chart, if applicable    Complicating factors:    Care impacted by chronic illness: N/A    Care affected by social determinants of health: N/A    Disposition considerations: Discharge. I prescribed additional prescription strength medication(s) as charted. I considered admission, but ultimately discharged patient given reassuring workup and improvement in symptoms.      MEDICATIONS GIVEN IN THE EMERGENCY:  Medications   0.9% sodium chloride BOLUS (1,000 mLs Intravenous New Bag 23)   metoclopramide (REGLAN) injection 10 mg (10 mg Intravenous Given 23)   diphenhydrAMINE (BENADRYL) injection 25 mg (25 mg Intravenous Given 23)       NEW PRESCRIPTIONS STARTED AT TODAY'S ER VISIT:  New Prescriptions    No medications on file        =================================================================  HPI    Patient information was obtained from: Patient  Use of : N/A      Catherine Rangel is a 32 year old female with a past medical history of fibromyalgia, GERD, PCOS, pseudotumor cerebri,  currently 11w6d pregnant who presents by walk in for the evaluation of vomiting, diarrhea multiple symptoms. Patient reports she awoke this morning with diarrhea, a headache, vomiting, low abdominal cramping, a mild cough, rhinorrhea, \"dizziness\", generalized weakness, malaise. Patient reports she was having urinary frequency last night.    Denies fevers, vaginal bleeding, dysuria, hematuria, hematochezia, hematemesis. No other reported complaints at this time. No reported sick contacts. Patient reports she has a remote history of recurrent headaches which she has not had \"in a while\".      REVIEW OF SYSTEMS   Review of Systems   Constitutional: Negative for fever.        Positive " for generalized weakness, malaise.   HENT: Positive for rhinorrhea.    Respiratory: Positive for cough (mild).    Gastrointestinal: Positive for abdominal pain (low abdominal cramping), diarrhea, nausea and vomiting. Negative for blood in stool.        Denies hematemesis.   Genitourinary: Positive for frequency. Negative for dysuria, hematuria and vaginal bleeding.   Neurological: Positive for dizziness and headaches.   All other systems reviewed and are negative.       PAST MEDICAL HISTORY:  Past Medical History:   Diagnosis Date     Depression      Dural sinus thrombosis      Fibromyalgia      GERD (gastroesophageal reflux disease)      Obesity        PAST SURGICAL HISTORY:  Past Surgical History:   Procedure Laterality Date     CHOLECYSTECTOMY         CURRENT MEDICATIONS:    No current facility-administered medications for this encounter.    Current Outpatient Medications:      EPINEPHrine (EPIPEN) 0.3 mg/0.3 mL atIn, [EPINEPHRINE (EPIPEN) 0.3 MG/0.3 ML ATIN] Inject 0.3 mL (0.3 mg total) into the shoulder, thigh, or buttocks as needed., Disp: 1, Rfl: 0     famotidine (PEPCID) 40 MG tablet, [FAMOTIDINE (PEPCID) 40 MG TABLET] Take 1 tablet (40 mg total) by mouth at bedtime as needed for heartburn., Disp: 20 tablet, Rfl: 0     lidocaine (XYLOCAINE) 5 % ointment, [LIDOCAINE (XYLOCAINE) 5 % OINTMENT] Please apply to the affected area of the mouth up to 4 times a day for the next week., Disp: 50 g, Rfl: 0     predniSONE (DELTASONE) 50 MG tablet, [PREDNISONE (DELTASONE) 50 MG TABLET] Take 1 tablet (50 mg total) by mouth daily., Disp: 5 tablet, Rfl: 0    ALLERGIES:  Allergies   Allergen Reactions     Blood-Group Specific Substance Other (See Comments)     Patient has probable passive anti-D. Blood product orders may be delayed. Draw one red top and two purple top tubes for all Type and Screen/Type and crossmatch orders.     Cefaclor Other (See Comments)     Latex Itching       FAMILY HISTORY:  History reviewed. No  pertinent family history.    SOCIAL HISTORY:   Social History     Socioeconomic History     Marital status: Single     Spouse name: None     Number of children: None     Years of education: None     Highest education level: None   Tobacco Use     Smoking status: Never   Substance and Sexual Activity     Alcohol use: Yes     Comment: Alcoholic Drinks/day: occasional       PHYSICAL EXAM:    Vitals: /76   Pulse 97   Temp 97.5  F (36.4  C) (Temporal)   Resp 16   LMP 11/21/2022   SpO2 98%    Constitutional: Well developed, well nourished. Comfortable appearing.  HENT: Normocephalic, atraumatic  Eyes: Pupils mid range, sclera white, no discharge  Neck- Gross ROM intact.   Respiratory: Lungs CTAB. Normal work of breathing, normal rate, speaks in full sentences  Cardiovascular: Normal heart rate and regular rhythm  Abdomen: soft, not distended, not tender to palpation, no guarding  Musculoskeletal: Moving all 4 extremities intentionally and without pain.  Neurologic: Alert & oriented, cranial nerves grossly intact. Speech clear. Ambulates with normal steady gait. No deficits noted.  Psychiatric: Affect normal, cooperative.       LAB:  All pertinent labs reviewed and interpreted.  Labs Ordered and Resulted from Time of ED Arrival to Time of ED Departure   ROUTINE UA WITH MICROSCOPIC REFLEX TO CULTURE - Abnormal       Result Value    Color Urine Yellow      Appearance Urine Clear      Glucose Urine Negative      Bilirubin Urine Negative      Ketones Urine 20 (*)     Specific Gravity Urine 1.029      Blood Urine Negative      pH Urine 6.0      Protein Albumin Urine 20 (*)     Urobilinogen Urine <2.0      Nitrite Urine Negative      Leukocyte Esterase Urine Negative      Mucus Urine Present (*)     RBC Urine 1      WBC Urine 3      Squamous Epithelials Urine 4 (*)    CBC WITH PLATELETS AND DIFFERENTIAL - Abnormal    WBC Count 9.6      RBC Count 4.95      Hemoglobin 15.7      Hematocrit 46.7      MCV 94      MCH 31.7       MCHC 33.6      RDW 12.4      Platelet Count 268      % Neutrophils 87      % Lymphocytes 9      % Monocytes 3      % Eosinophils 1      % Basophils 0      % Immature Granulocytes 0      NRBCs per 100 WBC 0      Absolute Neutrophils 8.4 (*)     Absolute Lymphocytes 0.9      Absolute Monocytes 0.3      Absolute Eosinophils 0.1      Absolute Basophils 0.0      Absolute Immature Granulocytes 0.0      Absolute NRBCs 0.0     COMPREHENSIVE METABOLIC PANEL - Normal    Sodium 138      Potassium 3.6      Chloride 104      Carbon Dioxide (CO2) 22      Anion Gap 12      Urea Nitrogen 8      Creatinine 0.71      Calcium 9.5      Glucose 95      Alkaline Phosphatase 67      AST 24      ALT 28      Protein Total 7.7      Albumin 3.5      Bilirubin Total 0.5      GFR Estimate >90     LIPASE - Normal    Lipase 20         RADIOLOGY:  US OB > 14 Weeks   Final Result   IMPRESSION:    1.  Single living intrauterine gestation at 12 weeks 6 days with EDC 08/25/2023 based on prior dating. Normal interval growth.                   EKG:   N/A    PROCEDURES:   Procedures     I, Stef Domínguez, am serving as a scribe to document services personally performed by Dr. Avis Hayden based on my observation and the provider's statements to me. I, Avis Hayden MD attest that Stef Domínguez is acting in a scribe capacity, has observed my performance of the services and has documented them in accordance with my direction.    Avis Hayden M.D.  Emergency Medicine  [facility] EMERGENCY ROOM  [address] [facility] [id number]  [phone]  Dept: [phone]     Avis Hayden MD  02/16/23 1890

## 2023-02-26 ENCOUNTER — APPOINTMENT (OUTPATIENT)
Dept: ULTRASOUND IMAGING | Facility: CLINIC | Age: 33
End: 2023-02-26
Attending: NURSE PRACTITIONER
Payer: COMMERCIAL

## 2023-02-26 ENCOUNTER — HOSPITAL ENCOUNTER (EMERGENCY)
Facility: CLINIC | Age: 33
Discharge: HOME | End: 2023-02-26
Attending: EMERGENCY MEDICINE | Admitting: EMERGENCY MEDICINE
Payer: COMMERCIAL

## 2023-02-26 VITALS
DIASTOLIC BLOOD PRESSURE: 74 MMHG | BODY MASS INDEX: 40.49 KG/M2 | HEART RATE: 82 BPM | RESPIRATION RATE: 16 BRPM | SYSTOLIC BLOOD PRESSURE: 124 MMHG | OXYGEN SATURATION: 97 % | WEIGHT: 250.88 LBS | TEMPERATURE: 97.6 F

## 2023-02-26 DIAGNOSIS — O46.92 VAGINAL BLEEDING IN PREGNANCY, SECOND TRIMESTER: ICD-10-CM

## 2023-02-26 LAB
ABO/RH(D): NORMAL
ALBUMIN UR-MCNC: 10 MG/DL
ANION GAP SERPL CALCULATED.3IONS-SCNC: 10 MMOL/L (ref 5–18)
APPEARANCE UR: CLEAR
BACTERIA #/AREA URNS HPF: ABNORMAL /HPF
BASOPHILS # BLD AUTO: 0 10E3/UL (ref 0–0.2)
BASOPHILS NFR BLD AUTO: 0 %
BILIRUB UR QL STRIP: NEGATIVE
BUN SERPL-MCNC: 6 MG/DL (ref 8–22)
CALCIUM SERPL-MCNC: 9.4 MG/DL (ref 8.5–10.5)
CHLORIDE BLD-SCNC: 105 MMOL/L (ref 98–107)
CO2 SERPL-SCNC: 22 MMOL/L (ref 22–31)
COLOR UR AUTO: YELLOW
CREAT SERPL-MCNC: 0.58 MG/DL (ref 0.6–1.1)
EOSINOPHIL # BLD AUTO: 0 10E3/UL (ref 0–0.7)
EOSINOPHIL NFR BLD AUTO: 1 %
ERYTHROCYTE [DISTWIDTH] IN BLOOD BY AUTOMATED COUNT: 12.1 % (ref 10–15)
FETAL BLEED SCREEN: NORMAL
GFR SERPL CREATININE-BSD FRML MDRD: >90 ML/MIN/1.73M2
GLUCOSE BLD-MCNC: 85 MG/DL (ref 70–125)
GLUCOSE UR STRIP-MCNC: NEGATIVE MG/DL
HCT VFR BLD AUTO: 40.4 % (ref 35–47)
HGB BLD-MCNC: 13.7 G/DL (ref 11.7–15.7)
HGB UR QL STRIP: ABNORMAL
IMM GRANULOCYTES # BLD: 0 10E3/UL
IMM GRANULOCYTES NFR BLD: 0 %
KETONES UR STRIP-MCNC: 10 MG/DL
LEUKOCYTE ESTERASE UR QL STRIP: ABNORMAL
LYMPHOCYTES # BLD AUTO: 1.7 10E3/UL (ref 0.8–5.3)
LYMPHOCYTES NFR BLD AUTO: 20 %
MCH RBC QN AUTO: 31.5 PG (ref 26.5–33)
MCHC RBC AUTO-ENTMCNC: 33.9 G/DL (ref 31.5–36.5)
MCV RBC AUTO: 93 FL (ref 78–100)
MONOCYTES # BLD AUTO: 0.3 10E3/UL (ref 0–1.3)
MONOCYTES NFR BLD AUTO: 4 %
MUCOUS THREADS #/AREA URNS LPF: PRESENT /LPF
NEUTROPHILS # BLD AUTO: 6.1 10E3/UL (ref 1.6–8.3)
NEUTROPHILS NFR BLD AUTO: 75 %
NITRATE UR QL: NEGATIVE
NRBC # BLD AUTO: 0 10E3/UL
NRBC BLD AUTO-RTO: 0 /100
PH UR STRIP: 6.5 [PH] (ref 5–7)
PLATELET # BLD AUTO: 240 10E3/UL (ref 150–450)
POTASSIUM BLD-SCNC: 4 MMOL/L (ref 3.5–5)
RBC # BLD AUTO: 4.35 10E6/UL (ref 3.8–5.2)
RBC URINE: 12 /HPF
SODIUM SERPL-SCNC: 137 MMOL/L (ref 136–145)
SP GR UR STRIP: 1.02 (ref 1–1.03)
SPECIMEN EXPIRATION DATE: NORMAL
SPERM #/AREA URNS HPF: PRESENT /HPF
SQUAMOUS EPITHELIAL: 3 /HPF
UROBILINOGEN UR STRIP-MCNC: <2 MG/DL
WBC # BLD AUTO: 8.2 10E3/UL (ref 4–11)
WBC URINE: 7 /HPF

## 2023-02-26 PROCEDURE — 80048 BASIC METABOLIC PNL TOTAL CA: CPT | Performed by: NURSE PRACTITIONER

## 2023-02-26 PROCEDURE — 99285 EMERGENCY DEPT VISIT HI MDM: CPT | Mod: 25

## 2023-02-26 PROCEDURE — 86901 BLOOD TYPING SEROLOGIC RH(D): CPT | Performed by: EMERGENCY MEDICINE

## 2023-02-26 PROCEDURE — 250N000011 HC RX IP 250 OP 636: Performed by: EMERGENCY MEDICINE

## 2023-02-26 PROCEDURE — 81001 URINALYSIS AUTO W/SCOPE: CPT | Performed by: NURSE PRACTITIONER

## 2023-02-26 PROCEDURE — 85025 COMPLETE CBC W/AUTO DIFF WBC: CPT | Performed by: NURSE PRACTITIONER

## 2023-02-26 PROCEDURE — 76815 OB US LIMITED FETUS(S): CPT

## 2023-02-26 PROCEDURE — 36415 COLL VENOUS BLD VENIPUNCTURE: CPT | Performed by: NURSE PRACTITIONER

## 2023-02-26 RX ADMIN — HUMAN RHO(D) IMMUNE GLOBULIN 300 MCG: 1500 SOLUTION INTRAMUSCULAR; INTRAVENOUS at 12:33

## 2023-02-26 NOTE — ED TRIAGE NOTES
Pt arrives to ED with c/o vaginal bleeding since this morning pt endorses to abdominal cramping for the past couple of weeks. Pt is 14 weeks pregnant with 5th pregnancy and 3 living children. Pt states when after urinating she was wiping and noticed blood and clots on the toilet paper. No active bleeding now. Pt not wearing pad. Denies any urinary symptoms, dizziness or lightheadedness.      Triage Assessment     Row Name 02/26/23 1005       Triage Assessment (Adult)    Airway WDL WDL       Respiratory WDL    Respiratory WDL WDL       Skin Circulation/Temperature WDL    Skin Circulation/Temperature WDL WDL       Cardiac WDL    Cardiac WDL WDL       Peripheral/Neurovascular WDL    Peripheral Neurovascular WDL WDL       Cognitive/Neuro/Behavioral WDL    Cognitive/Neuro/Behavioral WDL WDL

## 2023-02-26 NOTE — ED PROVIDER NOTES
EMERGENCY DEPARTMENT ENCOUNTER      NAME: Catherine Rangel  AGE: 32 year old female  YOB: 1990  MRN: [id number]  EVALUATION DATE & TIME: No admission date for patient encounter.    PCP: Lucien Knutson    ED PROVIDER: Leon Chan D.O.      Chief Complaint   Patient presents with     Vaginal Bleeding - Pregnant       FINAL IMPRESSION:  1. Vaginal bleeding in pregnancy, second trimester        ED COURSE & MEDICAL DECISION MAKIN:48 AM I met with the patient to gather history and to perform my initial exam. I discussed the plan for care while in the Emergency Department.  11:51 AM I paged for OB.   11:55 AM I rechecked and updated the patient on test results. Pelvic exam was offered and patient prefers to decline at this time.   11:58 AM I spoke with Dr. Strange, OB, who recommends giving an additional RhoGAM injection today.   12:18 PM Patient received RhoGAM injection and is agreeable with discharge with close OB/GYN follow up. We reviewed supportive cares, symptomatic treatment, outpatient follow up, and reasons to return to the Emergency Department.           Pertinent Labs & Imaging studies reviewed. (See chart for details)  32 year old female presents to the Emergency Department for evaluation of vaginal bleeding that has since stopped.  Patient also denies any significant pain.  Differential includes miscarriage versus threatened miscarriage versus nonemergent source of vaginal bleeding.  Patient has known Rh- blood type, therefore RhoGAM was ordered, I did consult with OB/GYN due to her RhoGAM approximately 8 weeks ago, and they did recommend that we did administer the RhoGAM today.  Ultrasound today does show a normal IUP with a heart rate of 155 bpm.  There was some potential for abnormal finding of the edge of the placenta, however the patient has no pain to suggest that this would be as placental abruption, and I did instruct  the patient to call her OB/GYN tomorrow for close  follow-up this week.  She did verbalize understanding agreement with this.  As the patient is not actively bleeding, I discussed the potential of doing a pelvic exam and offered to her, however she refused, and I do not believe this to be inappropriate at this point.  As the patient does have a normal IUP, does not appear to be in miscarriage, and would technically meet the definition of threatened miscarriage.  Return precautions were discussed.    Medical Decision Making    History:    Supplemental history from: N/A    External Record(s) reviewed: Outpatient Record    Work Up:    Chart documentation includes differential considered and any EKGs or imaging independently interpreted by provider, where specified.    In additional to work up documented, I considered the following work up: Documented in chart, if applicable.    External consultation:    Discussion of management with another provider: OB-Gyn    Complicating factors:    Care impacted by chronic illness: N/A    Care affected by social determinants of health: N/A    Disposition considerations: Discharge. No recommendations on prescription strength medication(s). See documentation for any additional details.        At the conclusion of the encounter I discussed the results of all of the tests and the disposition. The questions were answered. The patient or family acknowledged understanding and was agreeable with the care plan.      HPI    Patient information was obtained from: Patient    Use of : N/A       Catherine Rangel is a  32 year old female currently 14 weeks gestation following with Prabha BAIG who presents via private vehicle for evaluation of vaginal bleeding.     Patient reports onset of vaginal bleeding with clots and abdominal cramping that began this morning. She denies any active bleeding or abdominal pain at this time. She reports having an O- blood type and received a RhoGAM injection on 2023. She does endorse a headache  and mild cough. Patient otherwise denies fever, lightheadedness, congestion, sore throat, or any other symptoms or concerns at this time.        REVIEW OF SYSTEMS  Constitutional:  Denies fever, chills, weight loss or weakness  Eyes:  No pain, discharge, redness  HENT:  Denies sore throat, ear pain, congestion  Respiratory: Positive for mild cough. No SOB, wheeze  Cardiovascular:  No CP, palpitations  GI:  Denies current abdominal pain, nausea, vomiting, diarrhea  : Denies dysuria, hematuria, current vaginal bleeding (resolved)  Musculoskeletal:  Denies any new muscle/joint pain, swelling or loss of function.  Skin:  Denies rash, pallor  Neurologic: Positive for headache. Denies lightheadedness, focal weakness, or sensory changes  Lymph: Denies swollen nodes    All other systems negative unless noted in HPI.    PAST MEDICAL HISTORY:  Past Medical History:   Diagnosis Date     Depression      Dural sinus thrombosis      Fibromyalgia      GERD (gastroesophageal reflux disease)      Obesity        PAST SURGICAL HISTORY:  Past Surgical History:   Procedure Laterality Date     CHOLECYSTECTOMY           CURRENT MEDICATIONS:    Current Facility-Administered Medications   Medication     rho(D) immune globulin (RHOPHYLAC) injection 300 mcg     Current Outpatient Medications   Medication     EPINEPHrine (EPIPEN) 0.3 mg/0.3 mL atIn     famotidine (PEPCID) 40 MG tablet     lidocaine (XYLOCAINE) 5 % ointment     predniSONE (DELTASONE) 50 MG tablet         ALLERGIES:  Allergies   Allergen Reactions     Blood-Group Specific Substance Other (See Comments)     Patient has probable passive anti-D. Blood product orders may be delayed. Draw one red top and two purple top tubes for all Type and Screen/Type and crossmatch orders.     Cefaclor Other (See Comments)     Latex Itching       FAMILY HISTORY:  No family history on file.    SOCIAL HISTORY:  Social History     Socioeconomic History     Marital status: Single   Tobacco Use      Smoking status: Never   Substance and Sexual Activity     Alcohol use: Yes     Comment: Alcoholic Drinks/day: occasional       VITALS:  Patient Vitals for the past 24 hrs:   BP Temp Temp src Pulse Resp SpO2 Weight   02/26/23 1003 127/74 97.6  F (36.4  C) Temporal 92 16 99 % 113.8 kg (250 lb 14.1 oz)       PHYSICAL EXAM    VITAL SIGNS: /74   Pulse 92   Temp 97.6  F (36.4  C) (Temporal)   Resp 16   Wt 113.8 kg (250 lb 14.1 oz)   LMP 11/21/2022   SpO2 99%   BMI 40.49 kg/m    General Appearance: Well-appearing, well-nourished, no acute distress   Head:  Normocephalic, without obvious abnormality, atraumatic  Eyes:  PERRL, conjunctiva/corneas clear, EOM's intact,  ENT:  Lips, mucosa, and tongue normal, membranes are moist without pallor  Neck:  Normal ROM, symmetrical, trachea midline    Abdomen:  Soft, non-tender, no rebound or guarding.  Musculoskeletal: Full ROM, no edema, no cyanosis, good ROM of major joints  Integument:  Warm, Dry, No erythema, No rash.    Neurologic:  Alert & oriented.  No focal deficits appreciated.  Ambulatory.  Psychiatric:  Affect normal, Judgment normal, Mood normal.      LABS  Results for orders placed or performed during the hospital encounter of 02/26/23 (from the past 24 hour(s))   US OB >14 Weeks Limited wo Fetal Measurement    Narrative    EXAM: US OB LIMITED >14 WEEKS WO FETAL MEASUREMENT  LOCATION: [facility]  DATE/TIME: 2/26/2023 11:48 AM    INDICATION: Bleeding.  COMPARISON: 02/16/2023.  TECHNIQUE: Transabdominal ultrasound.    FINDINGS:    Single living fetus, transverse presentation.    HEART RATE: 155 bpm.    SDP 4.5 cm.    PLACENTA: Posterior. No previa. Small roughly 1 x 2 cm hypoechoic area along the posterior left placenta.    CERVIX: 3.4 cm.      Impression    IMPRESSION:    1.  Single living intrauterine gestation.    2.  Small hypoechoic area along the left posterior placental edge. This is not definitively a placental lake and small  amount of hemorrhage is possible. Consider attention on short-term follow-up (to exclude other etiology, such as placental abruption,   though would expect significant pain associated with this).     CBC with platelets + differential    Narrative    The following orders were created for panel order CBC with platelets + differential.  Procedure                               Abnormality         Status                     ---------                               -----------         ------                     CBC with platelets and d...[[id number]]                      In process                   Please view results for these tests on the individual orders.         RADIOLOGY  US OB >14 Weeks Limited wo Fetal Measurement   Final Result   IMPRESSION:      1.  Single living intrauterine gestation.      2.  Small hypoechoic area along the left posterior placental edge. This is not definitively a placental lake and small amount of hemorrhage is possible. Consider attention on short-term follow-up (to exclude other etiology, such as placental abruption,    though would expect significant pain associated with this).              I have independently interpreted the above image, IUP with a heart rate of 155 bpm on pelvic ultrasound. See radiology report for detail.        MEDICATIONS GIVEN IN THE EMERGENCY:  Medications   rho(D) immune globulin (RHOPHYLAC) injection 300 mcg (has no administration in time range)       NEW PRESCRIPTIONS STARTED AT TODAY'S ER VISIT  New Prescriptions    No medications on file        I, Korina Sosa, am serving as a scribe to document services personally performed by Leon Chan D.O., based on my observations and the provider's statements to me.  I, Leon Chan D.O., attest that Korina Sosa is acting in a scribe capacity, has observed my performance of the services and has documented them in accordance with my direction.     Leon Chan D.O.  Emergency Medicine  [facility]  [facility] EMERGENCY ROOM  [facility] [facility] [address]  [phone]  Dept: [phone]      Leon Chan DO  02/26/23 1326

## 2024-02-21 ENCOUNTER — HOSPITAL ENCOUNTER (EMERGENCY)
Facility: CLINIC | Age: 34
Discharge: HOME | End: 2024-02-21
Attending: EMERGENCY MEDICINE | Admitting: EMERGENCY MEDICINE
Payer: COMMERCIAL

## 2024-02-21 VITALS
HEART RATE: 88 BPM | SYSTOLIC BLOOD PRESSURE: 126 MMHG | OXYGEN SATURATION: 98 % | TEMPERATURE: 98.2 F | DIASTOLIC BLOOD PRESSURE: 62 MMHG | RESPIRATION RATE: 16 BRPM

## 2024-02-21 DIAGNOSIS — J06.9 VIRAL URI: ICD-10-CM

## 2024-02-21 LAB
FLUAV RNA SPEC QL NAA+PROBE: NEGATIVE
FLUBV RNA RESP QL NAA+PROBE: NEGATIVE
GROUP A STREP BY PCR: NOT DETECTED
RSV RNA SPEC NAA+PROBE: NEGATIVE
SARS-COV-2 RNA RESP QL NAA+PROBE: NEGATIVE

## 2024-02-21 PROCEDURE — 99283 EMERGENCY DEPT VISIT LOW MDM: CPT

## 2024-02-21 PROCEDURE — 87651 STREP A DNA AMP PROBE: CPT | Performed by: EMERGENCY MEDICINE

## 2024-02-21 PROCEDURE — 87637 SARSCOV2&INF A&B&RSV AMP PRB: CPT | Performed by: EMERGENCY MEDICINE

## 2024-02-21 NOTE — ED PROVIDER NOTES
EMERGENCY DEPARTMENT ENCOUNTER      NAME: Catherine Rangel  AGE: 33 year old female  YOB: 1990  MRN: [id number]  EVALUATION DATE & TIME: No admission date for patient encounter.    PCP: Lucien Knutson    ED PROVIDER: Chasity Cobian MD      Chief Complaint   Patient presents with    Pharyngitis    Cough    Nasal Congestion         FINAL IMPRESSION:  1. Viral URI          ED COURSE & MEDICAL DECISION MAKIN:40 AM I met with the patient to obtain patient history and performed a physical exam. Discussed plan for ED work up including potential diagnostic studies and interventions.  8:58 AM Reevaluated and updated the patient with findings.  Patient with negative strep and negative COVID and influenza.  We discussed the plan for discharge and the patient is agreeable. Reviewed supportive cares, symptomatic treatment, outpatient follow up, and reasons to return to the Emergency Department. Patient to be discharged by ED RN.     Pertinent Labs & Imaging studies reviewed. (See chart for details)  33 year old female presents to the Emergency Department for evaluation of 3 days of cough, congestion, sore throat.  Her kids have been sick with similar symptoms.  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  She denies fever, has had a normal appetite.  On exam, patient is well-appearing, nontoxic.  Plan for strep testing, COVID and influenza testing.  I did offer a chest x-ray, the patient declines at this time.    At the conclusion of the encounter I discussed the results of all of the tests and the disposition. The questions were answered. The patient or family acknowledged understanding and was agreeable with the care plan.       Medical Decision Making  Obtained supplemental history:Supplemental history obtained?: No  Reviewed external records: External records reviewed?: Documented in chart  Care impacted by chronic illness:N/A  Care significantly affected by social  determinants of health:Access to Medical Care  Did you consider but not order tests?: Work up considered but not performed and documented in chart, if applicable  Did you interpret images independently?: Independent interpretation of ECG and images noted in documentation, when applicable.  Consultation discussion with other provider:Did you involve another provider (consultant, , pharmacy, etc.)?: No  Discharge. No recommendations on prescription strength medication(s). See documentation for any additional details.      MEDICATIONS GIVEN IN THE EMERGENCY:  Medications - No data to display    NEW PRESCRIPTIONS STARTED AT TODAY'S ER VISIT  New Prescriptions    No medications on file          =================================================================    HPI    Patient information was obtained from: patient    Use of : N/A         Catherine Rangel is a 33 year old female with a pertinent history of GERD, allergic rhinitis, obesity, who presents to this ED via walk-in with family for evaluation of cough and sore throat.    Patient reports she developed mildly productive cough and congestion a few days ago and started to develop a persistent sore throat yesterday that radiates into her ears. She did test for covid about 3 days ago which was negative. She does note that her children are sick with pink eye and she works in . She denies history of asthma. She denies associating fever or decrease in appetite or liquid intake. She hasn't taken any medications for her symptoms yet. There were no other concerns/complaints at this time.    Shx: She is a non smoker.      PAST MEDICAL HISTORY:  Past Medical History:   Diagnosis Date    Depression     Dural sinus thrombosis     Fibromyalgia     GERD (gastroesophageal reflux disease)     Obesity        PAST SURGICAL HISTORY:  Past Surgical History:   Procedure Laterality Date    CHOLECYSTECTOMY             CURRENT MEDICATIONS:    EPINEPHrine (EPIPEN)  0.3 mg/0.3 mL atIn  famotidine (PEPCID) 40 MG tablet  lidocaine (XYLOCAINE) 5 % ointment  predniSONE (DELTASONE) 50 MG tablet        ALLERGIES:  Allergies   Allergen Reactions    Blood-Group Specific Substance Other (See Comments)     Patient has probable passive anti-D. Blood product orders may be delayed. Draw one red top and two purple top tubes for all Type and Screen/Type and crossmatch orders.    Cefaclor Other (See Comments)    Latex Itching       FAMILY HISTORY:  History reviewed. No pertinent family history.    SOCIAL HISTORY:   Social History     Socioeconomic History    Marital status: Single   Tobacco Use    Smoking status: Never   Substance and Sexual Activity    Alcohol use: Yes     Comment: Alcoholic Drinks/day: occasional       VITALS:  /84   Pulse 96   Temp 98.4  F (36.9  C) (Temporal)   Resp 16   LMP 02/13/2024   SpO2 96%     PHYSICAL EXAM    Constitutional: Well developed, Well nourished, NAD  HENT: Normocephalic, Atraumatic, Bilateral external ears normal, Oropharynx normal, no tonsillar exudate, right anterior cervical lymphadenopathy with tenderness, mucous membranes moist, Nose normal. TM's clear bilaterally  Neck- Normal range of motion, No tenderness, Supple, No stridor.  Eyes: PERRL, EOMI, Conjunctiva normal, No discharge.   Respiratory: Normal breath sounds, No respiratory distress  Cardiovascular: Normal heart rate, Regular rhythm  GI: Bowel sounds normal, Soft, No tenderness,   Musculoskeletal: No edema. Good range of motion in all major joints. No tenderness to palpation or major deformities noted.   Integument: Warm, Dry, No erythema, No rash  Neurologic: Alert & oriented x 3, Normal motor function, Normal sensory function, No focal deficits noted. Normal gait.   Psychiatric: Affect normal, Judgment normal, Mood normal.      LAB:  All pertinent labs reviewed and interpreted.  Results for orders placed or performed during the hospital encounter of 02/21/24   Symptomatic  Influenza A/B, RSV, & SARS-CoV2 PCR (COVID-19) Nose    Specimen: Nose; Swab   Result Value Ref Range    Influenza A PCR Negative Negative    Influenza B PCR Negative Negative    RSV PCR Negative Negative    SARS CoV2 PCR Negative Negative   Group A Streptococcus PCR Throat Swab    Specimen: Throat; Swab   Result Value Ref Range    Group A strep by PCR Not Detected Not Detected       RADIOLOGY:  Reviewed all pertinent imaging. Please see official radiology report.  No orders to display       EKG:    None    PROCEDURES:   None      I, Maryellen Stone, am serving as a scribe to document services personally performed by Chasity Cobian MD based on my observation and the provider's statements to me. I,Chasity Cobian MD, attest that Maryellen Stone is acting in a scribe capacity, has observed my performance of the services and has documented them in accordance with my direction.    Chasity Cobian MD  Emergency Medicine  [facility] EMERGENCY ROOM  [facility] [facility] [id number] [phone]       Chasity Cobian MD  02/21/24 0909

## 2024-02-21 NOTE — ED TRIAGE NOTES
3d cough and congestion and ST.     Triage Assessment (Adult)       Row Name 02/21/24 0739          Triage Assessment    Airway WDL WDL        Respiratory WDL    Respiratory WDL cough        Skin Circulation/Temperature WDL    Skin Circulation/Temperature WDL WDL        Cardiac WDL    Cardiac WDL WDL        Peripheral/Neurovascular WDL    Peripheral Neurovascular WDL WDL        Cognitive/Neuro/Behavioral WDL    Cognitive/Neuro/Behavioral WDL WDL

## 2025-04-22 ENCOUNTER — HOSPITAL ENCOUNTER (EMERGENCY)
Facility: CLINIC | Age: 35
Discharge: HOME | End: 2025-04-22
Payer: COMMERCIAL

## 2025-04-22 ENCOUNTER — APPOINTMENT (OUTPATIENT)
Dept: RADIOLOGY | Facility: CLINIC | Age: 35
End: 2025-04-22
Payer: COMMERCIAL

## 2025-04-22 VITALS
SYSTOLIC BLOOD PRESSURE: 121 MMHG | HEIGHT: 66 IN | DIASTOLIC BLOOD PRESSURE: 73 MMHG | BODY MASS INDEX: 45 KG/M2 | TEMPERATURE: 97.2 F | OXYGEN SATURATION: 100 % | WEIGHT: 280 LBS | HEART RATE: 83 BPM | RESPIRATION RATE: 14 BRPM

## 2025-04-22 DIAGNOSIS — R06.02 SHORTNESS OF BREATH: ICD-10-CM

## 2025-04-22 DIAGNOSIS — R07.9 CHEST PAIN, UNSPECIFIED TYPE: ICD-10-CM

## 2025-04-22 LAB
ANION GAP SERPL CALCULATED.3IONS-SCNC: 9 MMOL/L (ref 7–15)
ATRIAL RATE - MUSE: 80 BPM
BASOPHILS # BLD AUTO: 0 10E3/UL (ref 0–0.2)
BASOPHILS NFR BLD AUTO: 1 %
BUN SERPL-MCNC: 12.1 MG/DL (ref 6–20)
CALCIUM SERPL-MCNC: 9.2 MG/DL (ref 8.8–10.4)
CHLORIDE SERPL-SCNC: 102 MMOL/L (ref 98–107)
CREAT SERPL-MCNC: 0.77 MG/DL (ref 0.51–0.95)
D DIMER PPP FEU-MCNC: 0.31 UG/ML FEU (ref 0–0.5)
DIASTOLIC BLOOD PRESSURE - MUSE: NORMAL MMHG
EGFRCR SERPLBLD CKD-EPI 2021: >90 ML/MIN/1.73M2
EOSINOPHIL # BLD AUTO: 0.1 10E3/UL (ref 0–0.7)
EOSINOPHIL NFR BLD AUTO: 1 %
ERYTHROCYTE [DISTWIDTH] IN BLOOD BY AUTOMATED COUNT: 12.3 % (ref 10–15)
GLUCOSE SERPL-MCNC: 89 MG/DL (ref 70–99)
HCO3 SERPL-SCNC: 27 MMOL/L (ref 22–29)
HCT VFR BLD AUTO: 41.3 % (ref 35–47)
HGB BLD-MCNC: 14.2 G/DL (ref 11.7–15.7)
HOLD SPECIMEN: NORMAL
IMM GRANULOCYTES # BLD: 0 10E3/UL
IMM GRANULOCYTES NFR BLD: 0 %
INTERPRETATION ECG - MUSE: NORMAL
LYMPHOCYTES # BLD AUTO: 1.8 10E3/UL (ref 0.8–5.3)
LYMPHOCYTES NFR BLD AUTO: 28 %
MCH RBC QN AUTO: 31.3 PG (ref 26.5–33)
MCHC RBC AUTO-ENTMCNC: 34.4 G/DL (ref 31.5–36.5)
MCV RBC AUTO: 91 FL (ref 78–100)
MONOCYTES # BLD AUTO: 0.3 10E3/UL (ref 0–1.3)
MONOCYTES NFR BLD AUTO: 5 %
NEUTROPHILS # BLD AUTO: 4.2 10E3/UL (ref 1.6–8.3)
NEUTROPHILS NFR BLD AUTO: 65 %
NRBC # BLD AUTO: 0 10E3/UL
NRBC BLD AUTO-RTO: 0 /100
P AXIS - MUSE: 55 DEGREES
PLATELET # BLD AUTO: 274 10E3/UL (ref 150–450)
POTASSIUM SERPL-SCNC: 4.1 MMOL/L (ref 3.4–5.3)
PR INTERVAL - MUSE: 194 MS
QRS DURATION - MUSE: 82 MS
QT - MUSE: 366 MS
QTC - MUSE: 422 MS
R AXIS - MUSE: 48 DEGREES
RBC # BLD AUTO: 4.54 10E6/UL (ref 3.8–5.2)
SODIUM SERPL-SCNC: 138 MMOL/L (ref 135–145)
SYSTOLIC BLOOD PRESSURE - MUSE: NORMAL MMHG
T AXIS - MUSE: 62 DEGREES
TROPONIN T SERPL HS-MCNC: <6 NG/L
VENTRICULAR RATE- MUSE: 80 BPM
WBC # BLD AUTO: 6.4 10E3/UL (ref 4–11)

## 2025-04-22 PROCEDURE — 80048 BASIC METABOLIC PNL TOTAL CA: CPT

## 2025-04-22 PROCEDURE — 71046 X-RAY EXAM CHEST 2 VIEWS: CPT

## 2025-04-22 PROCEDURE — 85004 AUTOMATED DIFF WBC COUNT: CPT

## 2025-04-22 PROCEDURE — 85379 FIBRIN DEGRADATION QUANT: CPT

## 2025-04-22 PROCEDURE — 36415 COLL VENOUS BLD VENIPUNCTURE: CPT

## 2025-04-22 PROCEDURE — 250N000011 HC RX IP 250 OP 636: Mod: JZ

## 2025-04-22 PROCEDURE — 84484 ASSAY OF TROPONIN QUANT: CPT

## 2025-04-22 PROCEDURE — 93005 ELECTROCARDIOGRAM TRACING: CPT | Performed by: EMERGENCY MEDICINE

## 2025-04-22 PROCEDURE — 99285 EMERGENCY DEPT VISIT HI MDM: CPT | Mod: 25

## 2025-04-22 PROCEDURE — 96374 THER/PROPH/DIAG INJ IV PUSH: CPT

## 2025-04-22 PROCEDURE — 82310 ASSAY OF CALCIUM: CPT

## 2025-04-22 RX ORDER — KETOROLAC TROMETHAMINE 15 MG/ML
15 INJECTION, SOLUTION INTRAMUSCULAR; INTRAVENOUS ONCE
Status: COMPLETED | OUTPATIENT
Start: 2025-04-22 | End: 2025-04-22

## 2025-04-22 RX ORDER — LORAZEPAM 1 MG/1
1 TABLET ORAL ONCE
Status: COMPLETED | OUTPATIENT
Start: 2025-04-22 | End: 2025-04-22

## 2025-04-22 RX ADMIN — KETOROLAC TROMETHAMINE 15 MG: 15 INJECTION, SOLUTION INTRAMUSCULAR; INTRAVENOUS at 10:12

## 2025-04-22 ASSESSMENT — COLUMBIA-SUICIDE SEVERITY RATING SCALE - C-SSRS
2. HAVE YOU ACTUALLY HAD ANY THOUGHTS OF KILLING YOURSELF IN THE PAST MONTH?: NO
1. IN THE PAST MONTH, HAVE YOU WISHED YOU WERE DEAD OR WISHED YOU COULD GO TO SLEEP AND NOT WAKE UP?: NO
6. HAVE YOU EVER DONE ANYTHING, STARTED TO DO ANYTHING, OR PREPARED TO DO ANYTHING TO END YOUR LIFE?: NO

## 2025-04-22 ASSESSMENT — ACTIVITIES OF DAILY LIVING (ADL)
ADLS_ACUITY_SCORE: 41

## 2025-04-22 NOTE — DISCHARGE INSTRUCTIONS
You were evaluated in the Emergency Department today for chest pain. Your evaluation has shown no signs of medical conditions requiring emergent intervention at this time, however we recommend that you follow up with your primary care physician or as soon as possible for further testing as an outpatient.    To help control pain, you can take Tylenol (acetaminophen) and Motrin (ibuprofen) every 6 hours. You can alternate these medications every three hours. For example, at 8 am take Tylenol then at 11 am take Motrin (ibuprofen) then take Tylenol (acetaminophen) at 2 pm, etc. Please read and follow instructions and recommended dosage on bottle. Do NOT exceed more than 3,000 mg of Tylenol (acetaminophen) or 1,800 mg of Motrin (ibuprofen) per 24 hours.      Please schedule an appointment for follow up with your primary care physician as directed.  Please schedule an appointment with for follow-up with your cardiologist as directed.  Please do not perform vigorous exercise until cleared by cardiology.      Return to the Emergency Department if you experience worsening or uncontrolled chest pain, shortness of breath, lightheadedness, feeling faint, nausea, vomiting, or any other concerning symptoms.    Thank you for choosing us for your care.

## 2025-04-22 NOTE — Clinical Note
Catherine Rangel was seen and treated in our emergency department on 4/22/2025.         Sincerely,     [facility] Emergency Room

## 2025-04-22 NOTE — ED PROVIDER NOTES
EMERGENCY DEPARTMENT ENCOUNTER      NAME: Catherine Rangel  AGE: 35 year old female  YOB: 1990  MRN: [id number]  EVALUATION DATE & TIME: No admission date for patient encounter.    PCP: Lucien Knutson    ED PROVIDER: John Witt MD    FINAL IMPRESSION:  1. Chest pain, unspecified type    2. Shortness of breath        ED COURSE & MEDICAL DECISION MAKING:    Pertinent Labs & Imaging studies reviewed. (See chart for details)  35 year old female presents to the Emergency Department for evaluation of chest pain and shortness of breath.  Differential diagnosis considered Myocardial infarction, acute coronary syndrome, congestive heart failure, aortic dissection, esophageal rupture, pulmonary embolism, pneumothorax, cardiac tamponade, cocaine mediated chest pain, endocarditis, GERD, pleurisy, rib fracture, costochondritis, pericarditis, herpes zoster, Myocardial infarction, acute coronary syndrome, congestive heart failure, COPD exacerbation, asthma exacerbation, pneumonia, aspiration, anaphylaxis, pulmonary embolism, pneumothorax, COVID-19, influenza A, anxiety.     Triage Note:      ED Course as of 04/22/25 1435   Tue Apr 22, 2025   0942 35-year-old female with a history of fibromyalgia, central venous thrombosis, Morbid obesity, PCOS presenting with chest pain and shortness of breath.  Symptoms and progressively worsened over the last 3 days.  Substernal does not radiate.  Intermittent sharp pleuritic pain.  Took Tylenol without relief.  Not associated with diaphoresis vomiting or worsening with exertion.  No pain or asymmetric swelling in the calf.  Does have a history of central venous thrombosis is not anticoagulated.  Will obtain D-dimer and chest pain workup.  May get chest x-ray pending dimer or CT PE if dimer is elevated.  EKG is nonischemic.  Patient is tearful on exam and admittedly anxious.  Will give Ativan.  Will also give Toradol she has not had anti-inflammatory medication.  Does have  a history of cholecystectomy as well as a tubal ligation.  Abdomen soft and nontender with no right upper quadrant pain or tenderness and I doubt retained gallbladder stone or intra-abdominal pathology.     1032 CBC with platelets differential  Unremarkable, doubt infectious etiology given normal  WBC count   1101 Toradol improved patient's pain.   1101 Troponin I less than 6 doubt ACS.   1101 Basic metabolic panel  Unremarkable.   1101 Dimer still pending and will call lab for explanation   1109 D dimer quantitative  Doubt PE aortic dissection   1110 Will obtain chest x-ray   1205 Chest XR,  PA & LAT  IMPRESSION: Lungs are clear. No hydropneumothorax or fracture. Heart and pulmonary vascularity are normal. No signs of acute disease.   1220 Reevaluated and updated the patient with results. We discussed the plan for discharge and the patient is agreeable. Reviewed supportive cares, symptomatic treatment, outpatient follow up, and reasons to return to the Emergency Department. Patient to be discharged by ED RN.        Not Applicable    I discussed the plan for discharge with the patient and patient is agreeable. We discussed supportive cares at home and reasons to return to the ER including new or worsening symptoms. All questions and concerns addressed to the best of my ability. Strict return precautions discussed. Patient to be discharge by RN.    At the conclusion of the encounter I discussed the results of the tests and the disposition. The questions were answered. The patient or family acknowledged understanding and was agreeable with the care plan.     MEDICATIONS GIVEN IN THE EMERGENCY:  Medications   ketorolac (TORADOL) injection 15 mg (15 mg Intravenous $Given 4/22/25 1012)   LORazepam (ATIVAN) tablet 1 mg (1 mg Oral Not Given 4/22/25 1013)       NEW PRESCRIPTIONS STARTED AT TODAY'S ER VISIT  Discharge Medication List as of 4/22/2025 12:30 PM        Discharge Medication List as of 4/22/2025 12:30 PM     "      =================================================================    HPI    Catherine Rangel is a 35 year old female with a pertinent history of hypertension, fibromyalgia, morbid obesity, PCOS, transverse sinus thrombosis, and anxiety who presents to this ED for evaluation of chest pain and shortness of breath.    Patient reports 3 days of chest pain that has been constant but worsening. Says she feels sharp pain closer to her heart and pressure otherwise. Endorses difficulty breathing due to chest tightness. Chest pressure worsens with movement. She has been taking Tylenol at night with little relief. No other pain medications. Denies vomiting or diaphoresis. Endorses intracranial pressure. Reports that she has had blood clots in her brain; has been off blood thinners for years. Denies history of clots in legs or lungs.     PHYSICAL EXAM    /73   Pulse 83   Temp 97.2  F (36.2  C) (Oral)   Resp 14   Ht 1.676 m (5' 6\")   Wt 127 kg (280 lb)   LMP 04/15/2025 (Approximate)   SpO2 100%   BMI 45.19 kg/m    Constitutional: Tearful, anxious.  Head: Normocephalic, atraumatic, mucous membranes moist, nose normal.   Neck: Supple, gross ROM intact.   Eyes: Pupils mid-range, sclera white.  Respiratory: Clear to auscultation bilaterally, no respiratory distress, no wheezing, speaks full sentences easily.  Cardiovascular: Normal heart rate, regular rhythm, no murmurs. No lower calf pain or asymmetric swelling.  GI: Soft, no tenderness to deep palpation in all quadrants.  Musculoskeletal: Moving all 4 extremities intentionally and without pain. No obvious deformity.  Skin: Warm, dry, no rash.  Neurologic: Alert & oriented x 3, speech clear, moving all extremities spontaneously   Psychiatric: Affect normal, cooperative.       LAB:  All pertinent labs reviewed and interpreted.  Results for orders placed or performed during the hospital encounter of 04/22/25   Chest XR,  PA & LAT    Impression    IMPRESSION: " Lungs are clear. No hydropneumothorax or fracture. Heart and pulmonary vascularity are normal. No signs of acute disease.   Basic metabolic panel   Result Value Ref Range    Sodium 138 135 - 145 mmol/L    Potassium 4.1 3.4 - 5.3 mmol/L    Chloride 102 98 - 107 mmol/L    Carbon Dioxide (CO2) 27 22 - 29 mmol/L    Anion Gap 9 7 - 15 mmol/L    Urea Nitrogen 12.1 6.0 - 20.0 mg/dL    Creatinine 0.77 0.51 - 0.95 mg/dL    GFR Estimate >90 >60 mL/min/1.73m2    Calcium 9.2 8.8 - 10.4 mg/dL    Glucose 89 70 - 99 mg/dL   Result Value Ref Range    Troponin T, High Sensitivity <6 <=14 ng/L   D dimer quantitative   Result Value Ref Range    D-Dimer Quantitative 0.31 0.00 - 0.50 ug/mL FEU   CBC with platelets and differential   Result Value Ref Range    WBC Count 6.4 4.0 - 11.0 10e3/uL    RBC Count 4.54 3.80 - 5.20 10e6/uL    Hemoglobin 14.2 11.7 - 15.7 g/dL    Hematocrit 41.3 35.0 - 47.0 %    MCV 91 78 - 100 fL    MCH 31.3 26.5 - 33.0 pg    MCHC 34.4 31.5 - 36.5 g/dL    RDW 12.3 10.0 - 15.0 %    Platelet Count 274 150 - 450 10e3/uL    % Neutrophils 65 %    % Lymphocytes 28 %    % Monocytes 5 %    % Eosinophils 1 %    % Basophils 1 %    % Immature Granulocytes 0 %    NRBCs per 100 WBC 0 <1 /100    Absolute Neutrophils 4.2 1.6 - 8.3 10e3/uL    Absolute Lymphocytes 1.8 0.8 - 5.3 10e3/uL    Absolute Monocytes 0.3 0.0 - 1.3 10e3/uL    Absolute Eosinophils 0.1 0.0 - 0.7 10e3/uL    Absolute Basophils 0.0 0.0 - 0.2 10e3/uL    Absolute Immature Granulocytes 0.0 <=0.4 10e3/uL    Absolute NRBCs 0.0 10e3/uL   Extra Red Top Tube   Result Value Ref Range    Hold Specimen JI    ECG 12-LEAD WITH MUSE (LHE)   Result Value Ref Range    Systolic Blood Pressure  mmHg    Diastolic Blood Pressure  mmHg    Ventricular Rate 80 BPM    Atrial Rate 80 BPM    RI Interval 194 ms    QRS Duration 82 ms     ms    QTc 422 ms    P Axis 55 degrees    R AXIS 48 degrees    T Axis 62 degrees    Interpretation ECG       Sinus rhythm  Normal ECG  When compared  with ECG of 24-Dec-2021 20:57,  No significant change was found  Confirmed by SEE ED PROVIDER NOTE FOR, ECG INTERPRETATION ([id number]),  Lottie Smith ([id number]) on 4/22/2025 9:37:37 AM         RADIOLOGY:  Reviewed all pertinent imaging. Please see official radiology report.  Chest XR,  PA & LAT   Final Result   IMPRESSION: Lungs are clear. No hydropneumothorax or fracture. Heart and pulmonary vascularity are normal. No signs of acute disease.          EKG:    Performed at: 09:28:04    Impression: Sinus rhythm, normal EKG    Rate: 80  Rhythm: Sinus  RI Interval: 194  QRS Interval: 82  QTc Interval: 422  ST Changes: None  Comparison: When compared with EKG of 12/24/2021 20:57, No significant change was found    I have independently reviewed and interpreted the EKG(s) documented above.      John Witt MD  [facility] EMERGENCY ROOM  [address] [facility] [id number] [phone]   =================================================================    BILLING:  Data  Category 1  Non-ED record review, if applicable. External record reviewed: N/A     Clinical information was obtained from an independent historian. History was obtained from: Patient     The following testing was considered but ultimately not selected after discussion with patient/family: N/A     Category 2  My independent interpretation of EKG, rhythm strip, radiology study: Chest x-ray did not reveal large pneumothorax     Category 3  Discussion of management with other physician/healthcare provider/other source: N/A       Risk  Prescription medication was considered, but ultimately not given after discussion with patient/family: I considered ordering a prescription for narcotic pain medicine.  However, I feel patient's condition can be adequately treated with non-narcotic medications and that the risk of a narcotic pain medicine prescription outweighs the benefits.     Chronic conditions affecting care: Hypertension  and fibromyalgia     Consideration of Admission/Observation: Escalation of care including admission/observation was considered given the complexity and risk of the patient's presenting complaint, exam findings, and/or their underlying comorbidities. However, ultimately I feel the patient is safe for outpatient management with close follow up. Reasoning: Work-up reassuring, does not reveal any acute life/organ threatening processes, patient's symptoms well controlled upon reevaluation, reexamination is reassuring, vitals are stable, patient agreeable with discharge, reliable for follow-up.       Considered admission for ACS rule out however ACS was ruled out in the emergency department and patient's symptoms improved with Toradol.  Believe she can be safely discharged home.        I, Aleksandra Jenkins, am serving as a scribe to document services personally performed by John Witt MD based on my observation and the provider's statements to me. I, John Witt MD, attest that Aleksandra Jenkins is acting in a scribe capacity, has observed my performance of the services and has documented them in accordance with my direction.     John Witt MD  04/23/25 0633

## 2025-04-22 NOTE — ED TRIAGE NOTES
"Pt presents to the ED with primary complaint of chest tightness and associated SOB.     Multiple other complaints and concern for possible new diagnosis with abdominal pain, hair loss, headache, and \"butterfly sign\".      Triage Assessment (Adult)       Row Name 04/22/25 0926          Triage Assessment    Airway WDL WDL        Respiratory WDL    Respiratory WDL X;rhythm/pattern     Rhythm/Pattern, Respiratory shortness of breath        Skin Circulation/Temperature WDL    Skin Circulation/Temperature WDL WDL        Cardiac WDL    Cardiac WDL X;chest pain        Chest Pain Assessment    Chest Pain Location anterior chest, left;anterior chest, right     Character tightness        Peripheral/Neurovascular WDL    Peripheral Neurovascular WDL WDL        Cognitive/Neuro/Behavioral WDL    Cognitive/Neuro/Behavioral WDL WDL                     "

## 2025-04-22 NOTE — Clinical Note
Catherine Rangel was seen and treated in our emergency department on 4/22/2025.  She may return to work on 04/23/2025.       If you have any questions or concerns, please don't hesitate to call.      John Witt MD

## 2025-09-03 ENCOUNTER — HOSPITAL ENCOUNTER (OUTPATIENT)
Dept: MRI IMAGING | Facility: CLINIC | Age: 35
Discharge: HOME | End: 2025-09-03
Attending: PSYCHIATRY & NEUROLOGY
Payer: COMMERCIAL

## 2025-09-03 DIAGNOSIS — R20.2 PARESTHESIAS: ICD-10-CM

## 2025-09-03 DIAGNOSIS — R29.2 HYPERREFLEXIA OF LOWER EXTREMITY: ICD-10-CM

## 2025-09-03 PROCEDURE — 72156 MRI NECK SPINE W/O & W/DYE: CPT

## 2025-09-03 PROCEDURE — A9585 GADOBUTROL INJECTION: HCPCS | Performed by: PSYCHIATRY & NEUROLOGY

## 2025-09-03 PROCEDURE — 255N000002 HC RX 255 OP 636: Performed by: PSYCHIATRY & NEUROLOGY

## 2025-09-03 RX ORDER — GADOBUTROL 604.72 MG/ML
10 INJECTION INTRAVENOUS ONCE
Status: COMPLETED | OUTPATIENT
Start: 2025-09-03 | End: 2025-09-03

## 2025-09-03 RX ADMIN — GADOBUTROL 10 ML: 604.72 INJECTION INTRAVENOUS at 12:14
